# Patient Record
Sex: MALE | Race: WHITE | Employment: OTHER | URBAN - METROPOLITAN AREA
[De-identification: names, ages, dates, MRNs, and addresses within clinical notes are randomized per-mention and may not be internally consistent; named-entity substitution may affect disease eponyms.]

---

## 2017-02-17 ENCOUNTER — HOSPITAL ENCOUNTER (OUTPATIENT)
Dept: MRI IMAGING | Age: 62
Discharge: HOME | End: 2017-02-17
Attending: INTERNAL MEDICINE | Admitting: INTERNAL MEDICINE

## 2017-02-17 DIAGNOSIS — D35.2 BENIGN NEOPLASM OF PITUITARY GLAND (HCC): ICD-10-CM

## 2017-02-17 DIAGNOSIS — D35.2 BENIGN TUMOR OF PITUITARY GLAND (HCC): ICD-10-CM

## 2017-07-28 ENCOUNTER — HOSPITAL ENCOUNTER (OUTPATIENT)
Dept: MRI IMAGING | Age: 62
Discharge: HOME | End: 2017-07-28
Attending: FAMILY MEDICINE | Admitting: FAMILY MEDICINE

## 2017-07-28 DIAGNOSIS — M54.41 LOW BACK PAIN WITH RIGHT-SIDED SCIATICA, UNSPECIFIED BACK PAIN LATERALITY, UNSPECIFIED CHRONICITY: ICD-10-CM

## 2017-07-28 DIAGNOSIS — M25.551 RIGHT HIP PAIN: ICD-10-CM

## 2017-07-28 DIAGNOSIS — M25.551 PAIN IN RIGHT HIP: ICD-10-CM

## 2017-07-28 LAB
ALBUMIN SERPL-MCNC: 4.5 GM/DL (ref 3.4–5)
ALP BLD-CCNC: 44 IU/L (ref 40–129)
ALT SERPL-CCNC: 30 U/L (ref 10–40)
ANION GAP SERPL CALCULATED.3IONS-SCNC: 14 MMOL/L (ref 4–16)
AST SERPL-CCNC: 17 IU/L (ref 15–37)
BILIRUB SERPL-MCNC: 0.7 MG/DL (ref 0–1)
BUN BLDV-MCNC: 25 MG/DL (ref 6–23)
CALCIUM SERPL-MCNC: 9.4 MG/DL (ref 8.3–10.6)
CHLORIDE BLD-SCNC: 100 MMOL/L (ref 99–110)
CHOLESTEROL: 208 MG/DL
CO2: 26 MMOL/L (ref 21–32)
CREAT SERPL-MCNC: 1.1 MG/DL (ref 0.9–1.3)
ESTIMATED AVERAGE GLUCOSE: 117 MG/DL
GFR AFRICAN AMERICAN: >60 ML/MIN/1.73M2
GFR NON-AFRICAN AMERICAN: >60 ML/MIN/1.73M2
GLUCOSE BLD-MCNC: 93 MG/DL (ref 70–140)
HBA1C MFR BLD: 5.7 % (ref 4.2–6.3)
HDLC SERPL-MCNC: 51 MG/DL
LDL CHOLESTEROL DIRECT: 143 MG/DL
POTASSIUM SERPL-SCNC: 4.5 MMOL/L (ref 3.5–5.1)
SODIUM BLD-SCNC: 140 MMOL/L (ref 135–145)
TOTAL PROTEIN: 6.9 GM/DL (ref 6.4–8.2)
TRIGL SERPL-MCNC: 118 MG/DL

## 2019-05-20 RX ORDER — LISINOPRIL 20 MG/1
20 TABLET ORAL DAILY
COMMUNITY

## 2019-05-20 NOTE — PROGRESS NOTES
.   1. Do not drink anything after 0930- unless instructed by your doctor prior to surgery. This includes no water, chewing gum or mints. 2. Follow your directions as prescribed by the doctor for your procedure and medications. 3.Check with your Doctor regarding stopping Plavix, Coumadin, Lovenox,Effient,Pradaxa,Xarelto, Fragmin or other blood thinners and follow their instructions. 4. Do not smoke, and do not drink any alcoholic beverages 24 hours prior to surgery. This includes NA Beer. 5. You may brush your teeth and gargle the morning of surgery. DO NOT SWALLOW WATER   6. You MUST make arrangements for a responsible adult to take you home after your surgery and be able to check on you every couple hours for the day. You will not be allowed to leave alone or drive yourself home. It is strongly suggested someone stay with you the first 24 hrs. Your surgery will be cancelled if you do not have a ride home. 7. Please wear simple, loose fitting clothing to the hospital.  [place] not bring valuables (money, credit cards, checkbooks, etc.) Do not wear any makeup (including no eye makeup) or nail polish on your fingers or toes. 8. DO NOT wear any jewelry or piercings on day of surgery. All body piercing jewelry must be removed. 9. If you have dentures, they will be removed before going to the OR; we will provide you a container. If you wear contact lenses or glasses, they will be removed; please bring a case for them. 10. If you  have a Living Will and Durable Power of  for Healthcare, please bring in a copy. 11 Please bring picture ID,  insurance card, paperwork from the doctors office    (H & P, Consent, & card for implantable devices).

## 2019-05-22 ENCOUNTER — HOSPITAL ENCOUNTER (OUTPATIENT)
Age: 64
Setting detail: OUTPATIENT SURGERY
Discharge: HOME | End: 2019-05-22
Attending: SPECIALIST | Admitting: SPECIALIST
Payer: COMMERCIAL

## 2019-05-22 VITALS
WEIGHT: 238 LBS | SYSTOLIC BLOOD PRESSURE: 116 MMHG | HEIGHT: 69 IN | BODY MASS INDEX: 35.25 KG/M2 | RESPIRATION RATE: 16 BRPM | HEART RATE: 70 BPM | DIASTOLIC BLOOD PRESSURE: 65 MMHG | OXYGEN SATURATION: 98 % | TEMPERATURE: 97 F

## 2019-05-22 PROCEDURE — 99152 MOD SED SAME PHYS/QHP 5/>YRS: CPT | Performed by: SPECIALIST

## 2019-05-22 PROCEDURE — 7100000011 HC PHASE II RECOVERY - ADDTL 15 MIN: Performed by: SPECIALIST

## 2019-05-22 PROCEDURE — 3609010600 HC COLONOSCOPY POLYPECTOMY SNARE/COLD BIOPSY: Performed by: SPECIALIST

## 2019-05-22 PROCEDURE — 7100000010 HC PHASE II RECOVERY - FIRST 15 MIN: Performed by: SPECIALIST

## 2019-05-22 PROCEDURE — 99153 MOD SED SAME PHYS/QHP EA: CPT | Performed by: SPECIALIST

## 2019-05-22 PROCEDURE — 2709999900 HC NON-CHARGEABLE SUPPLY: Performed by: SPECIALIST

## 2019-05-22 PROCEDURE — 6360000002 HC RX W HCPCS: Performed by: SPECIALIST

## 2019-05-22 PROCEDURE — 88305 TISSUE EXAM BY PATHOLOGIST: CPT

## 2019-05-22 RX ORDER — SODIUM CHLORIDE, SODIUM LACTATE, POTASSIUM CHLORIDE, CALCIUM CHLORIDE 600; 310; 30; 20 MG/100ML; MG/100ML; MG/100ML; MG/100ML
INJECTION, SOLUTION INTRAVENOUS CONTINUOUS
Status: DISCONTINUED | OUTPATIENT
Start: 2019-05-22 | End: 2019-05-22 | Stop reason: HOSPADM

## 2019-05-22 RX ORDER — MEPERIDINE HYDROCHLORIDE 50 MG/ML
INJECTION INTRAMUSCULAR; INTRAVENOUS; SUBCUTANEOUS PRN
Status: DISCONTINUED | OUTPATIENT
Start: 2019-05-22 | End: 2019-05-22 | Stop reason: ALTCHOICE

## 2019-05-22 RX ORDER — MIDAZOLAM HYDROCHLORIDE 1 MG/ML
INJECTION INTRAMUSCULAR; INTRAVENOUS PRN
Status: DISCONTINUED | OUTPATIENT
Start: 2019-05-22 | End: 2019-05-22 | Stop reason: ALTCHOICE

## 2019-05-22 ASSESSMENT — PAIN SCALES - GENERAL
PAINLEVEL_OUTOF10: 0
PAINLEVEL_OUTOF10: 0

## 2019-05-22 ASSESSMENT — PAIN - FUNCTIONAL ASSESSMENT: PAIN_FUNCTIONAL_ASSESSMENT: 0-10

## 2019-05-22 NOTE — PROGRESS NOTES
1210-.To Pacu, awake, denies any pain, nausea or SOB, states \"lightheaded\" family @ bedside, updated on plan of care, call light in reach. Dr. Richard Roy in to update on procedure, family @ bedside  1235-Lightheadedness resolved, repositioned to semi-fowlers, PO fluids given  1242-Patient care to [facility], RN with verbal report.

## 2019-05-22 NOTE — PROGRESS NOTES
2204  Report received from SINCERE Ferrera RN regarding patient's progress. More awake and sitting up in bed taking po fluids well. No c/o pain.

## 2019-05-22 NOTE — BRIEF OP NOTE
BRIEF COLONOSCOPY REPORT:    Pre-op Diagnosis: routine    Post-op Diagnosis: see impression below    Anesthesia: MAC  Estimated blood loss: less than 50 cc  Implants: none  Drains: none  Complications: none  Specimens- none- or as referred to below     Impression:    1) two 3 mm polyps removed    2) scattered sigmoid divertics   3) small internal hemorrhoids        Suggest:  1) If either of the resected polyps is an adenoma or sessile serrated polyp then follow up colonoscopy in 5 years is suggested. 2) if either of the polyps has a \"villous\" component or high-grade dysplasia, then repeat colonoscopy in 3 years   3) If both polyps are simply hyperplastic then follow up colonoscopy is not needed for 10 years. The complete operative report (including photos) is available in the following locations:   1) soft chart now   2) report will also be scanned and can then be found by going to \"chart review\" then \"notes\" then \"op report\" or by going to \"chart review\" then \"media\" then \"op report\". For review of photos, may need to go to page 2.

## 2021-06-14 ENCOUNTER — OFFICE VISIT (OUTPATIENT)
Dept: CARDIOLOGY CLINIC | Age: 66
End: 2021-06-14
Payer: MEDICARE

## 2021-06-14 VITALS
HEIGHT: 69 IN | SYSTOLIC BLOOD PRESSURE: 124 MMHG | BODY MASS INDEX: 36.67 KG/M2 | DIASTOLIC BLOOD PRESSURE: 80 MMHG | HEART RATE: 92 BPM | WEIGHT: 247.6 LBS

## 2021-06-14 DIAGNOSIS — I20.8 OTHER FORMS OF ANGINA PECTORIS (HCC): Primary | ICD-10-CM

## 2021-06-14 PROCEDURE — 3017F COLORECTAL CA SCREEN DOC REV: CPT | Performed by: INTERNAL MEDICINE

## 2021-06-14 PROCEDURE — G8417 CALC BMI ABV UP PARAM F/U: HCPCS | Performed by: INTERNAL MEDICINE

## 2021-06-14 PROCEDURE — 99204 OFFICE O/P NEW MOD 45 MIN: CPT | Performed by: INTERNAL MEDICINE

## 2021-06-14 PROCEDURE — 1123F ACP DISCUSS/DSCN MKR DOCD: CPT | Performed by: INTERNAL MEDICINE

## 2021-06-14 PROCEDURE — 4040F PNEUMOC VAC/ADMIN/RCVD: CPT | Performed by: INTERNAL MEDICINE

## 2021-06-14 PROCEDURE — 1036F TOBACCO NON-USER: CPT | Performed by: INTERNAL MEDICINE

## 2021-06-14 PROCEDURE — G8427 DOCREV CUR MEDS BY ELIG CLIN: HCPCS | Performed by: INTERNAL MEDICINE

## 2021-06-14 PROCEDURE — 93000 ELECTROCARDIOGRAM COMPLETE: CPT | Performed by: INTERNAL MEDICINE

## 2021-06-14 RX ORDER — FLUTICASONE PROPIONATE 220 UG/1
1 AEROSOL, METERED RESPIRATORY (INHALATION) 2 TIMES DAILY
COMMUNITY
Start: 2021-04-28

## 2021-06-14 RX ORDER — PREDNISONE 1 MG/1
TABLET ORAL
COMMUNITY
Start: 2021-05-26

## 2021-06-14 RX ORDER — ALBUTEROL SULFATE 90 UG/1
AEROSOL, METERED RESPIRATORY (INHALATION)
COMMUNITY
Start: 2020-10-19

## 2021-06-14 RX ORDER — ATORVASTATIN CALCIUM 40 MG/1
TABLET, FILM COATED ORAL
COMMUNITY
Start: 2021-03-23 | End: 2021-06-14

## 2021-06-14 RX ORDER — CLOTRIMAZOLE AND BETAMETHASONE DIPROPIONATE 10; .64 MG/G; MG/G
CREAM TOPICAL
COMMUNITY
Start: 2021-05-26

## 2021-06-14 NOTE — PROGRESS NOTES
Whit Pineda  is a  New patient  ,76 y.o.   male here for evaluation of the following chief complaint(s):    LE swelling        SUBJECTIVE/OBJECTIVE:  HPI : h/o  Had ablation for CVI, HTN  now here  Had swelling in LE had ablation  , has no CP, has mild exertional SOB  For a few weeks. Review of Systems    LE swelling, SOB    Vitals:    06/14/21 1321   BP: 124/80   Pulse: 92   Weight: 247 lb 9.6 oz (112.3 kg)   Height: 5' 9\" (1.753 m)     /80   Pulse 92   Ht 5' 9\" (1.753 m)   Wt 247 lb 9.6 oz (112.3 kg)   BMI 36.56 kg/m²   No flowsheet data found. Wt Readings from Last 3 Encounters:   06/14/21 247 lb 9.6 oz (112.3 kg)   05/22/19 238 lb (108 kg)   05/07/15 263 lb (119.3 kg)     Body mass index is 36.56 kg/m². Physical Exam     Neck: JVD  no    Lungs : clear    Cardio : Si and S2 audilble      Ext: edema  +1    All pertinent data reviewed  y    Meds : reviewed   y      Tests ordered    y    ASSESSMENT/PLAN:    - sob  And LE swelling:  Echo and ETT    -  Hypertension: Patients blood pressure is normal. Patient is advised about low sodium diet. Present medical regimen will not be changed. on liosinopril            Mortality from the morbid obesity is very high: Body mass index is 36.56 kg/m². An electronic signature was used to authenticate this note.     --Elvira Astorga MD

## 2021-07-09 ENCOUNTER — PROCEDURE VISIT (OUTPATIENT)
Dept: CARDIOLOGY CLINIC | Age: 66
End: 2021-07-09
Payer: MEDICARE

## 2021-07-09 DIAGNOSIS — I20.8 OTHER FORMS OF ANGINA PECTORIS (HCC): ICD-10-CM

## 2021-07-09 DIAGNOSIS — R06.02 SOB (SHORTNESS OF BREATH) ON EXERTION: ICD-10-CM

## 2021-07-09 LAB
LV EF: 58 %
LVEF MODALITY: NORMAL

## 2021-07-09 PROCEDURE — 93015 CV STRESS TEST SUPVJ I&R: CPT | Performed by: INTERNAL MEDICINE

## 2021-07-09 PROCEDURE — 93306 TTE W/DOPPLER COMPLETE: CPT | Performed by: INTERNAL MEDICINE

## 2021-07-12 ENCOUNTER — OFFICE VISIT (OUTPATIENT)
Dept: CARDIOLOGY CLINIC | Age: 66
End: 2021-07-12
Payer: MEDICARE

## 2021-07-12 VITALS
HEART RATE: 82 BPM | SYSTOLIC BLOOD PRESSURE: 138 MMHG | HEIGHT: 69 IN | DIASTOLIC BLOOD PRESSURE: 90 MMHG | OXYGEN SATURATION: 96 % | BODY MASS INDEX: 36.5 KG/M2 | WEIGHT: 246.4 LBS

## 2021-07-12 DIAGNOSIS — I10 ESSENTIAL HYPERTENSION: ICD-10-CM

## 2021-07-12 PROCEDURE — 3017F COLORECTAL CA SCREEN DOC REV: CPT | Performed by: NURSE PRACTITIONER

## 2021-07-12 PROCEDURE — 99213 OFFICE O/P EST LOW 20 MIN: CPT | Performed by: NURSE PRACTITIONER

## 2021-07-12 PROCEDURE — G8427 DOCREV CUR MEDS BY ELIG CLIN: HCPCS | Performed by: NURSE PRACTITIONER

## 2021-07-12 PROCEDURE — 1123F ACP DISCUSS/DSCN MKR DOCD: CPT | Performed by: NURSE PRACTITIONER

## 2021-07-12 PROCEDURE — 4040F PNEUMOC VAC/ADMIN/RCVD: CPT | Performed by: NURSE PRACTITIONER

## 2021-07-12 PROCEDURE — 1036F TOBACCO NON-USER: CPT | Performed by: NURSE PRACTITIONER

## 2021-07-12 PROCEDURE — G8417 CALC BMI ABV UP PARAM F/U: HCPCS | Performed by: NURSE PRACTITIONER

## 2021-07-12 ASSESSMENT — ENCOUNTER SYMPTOMS: SHORTNESS OF BREATH: 0

## 2021-07-12 NOTE — PROGRESS NOTES
7/12/2021  Primary cardiologist: Dr. Evelyn Flor  is an established 76 y.o.  male here for follow-up on recent testing echo and ETT  H/o chest pain       SUBJECTIVE/OBJECTIVE:  HPI : Justin is a 27-year-old gentleman who was initially seen as a consult in 2015 for shortness of breath and chest pain. He has a past history of hypertension and CVI s/p ablation. He was then seen again in 2021 with complaints of lower extremity edema. Justin reports he has been feeling well. States that his shortness of breath has improved with the initiation of Flovent. States he is swelling to lower extremities also has improved. Denies any chest pain or dizziness. Denies any palpitations. Review of Systems   Cardiovascular: Positive for dyspnea on exertion. Negative for chest pain, leg swelling and palpitations. Respiratory: Negative for shortness of breath. Neurological: Negative for dizziness and light-headedness. All other systems reviewed and are negative. Vitals:    07/12/21 0855   BP: (!) 138/90   Site: Right Upper Arm   Position: Sitting   Cuff Size: Large Adult   Pulse: 82   SpO2: 96%   Weight: 246 lb 6.4 oz (111.8 kg)   Height: 5' 9\" (1.753 m)     No flowsheet data found. Wt Readings from Last 3 Encounters:   07/12/21 246 lb 6.4 oz (111.8 kg)   06/14/21 247 lb 9.6 oz (112.3 kg)   05/22/19 238 lb (108 kg)     Body mass index is 36.39 kg/m². Physical Exam  Constitutional:       Appearance: He is obese. HENT:      Head: Normocephalic and atraumatic. Mouth/Throat:      Mouth: Mucous membranes are dry. Cardiovascular:      Rate and Rhythm: Normal rate and regular rhythm. Pulses: Normal pulses. Heart sounds: Normal heart sounds. Pulmonary:      Effort: Pulmonary effort is normal.      Breath sounds: Normal breath sounds. Abdominal:      Palpations: Abdomen is soft. Musculoskeletal:      Right lower leg: No edema. Left lower leg: No edema.    Skin:     General: Skin is warm and dry. Capillary Refill: Capillary refill takes less than 2 seconds. Neurological:      General: No focal deficit present. Mental Status: He is alert and oriented to person, place, and time. Psychiatric:         Mood and Affect: Mood normal.         Behavior: Behavior normal.            All pertinent data reviewed and discussed with patient    Current Outpatient Medications   Medication Sig Dispense Refill    Ascorbic Acid (VITAMIN C PO) Take by mouth      fluticasone (FLOVENT HFA) 220 MCG/ACT inhaler Inhale 1 puff into the lungs 2 times daily      albuterol sulfate  (90 Base) MCG/ACT inhaler inhale 2 puff by inhalation route every 4 - 6 hours as needed      clotrimazole-betamethasone (LOTRISONE) 1-0.05 % cream apply to affected area twice daily as needed for rash      lisinopril (PRINIVIL;ZESTRIL) 20 MG tablet Take 20 mg by mouth daily      fluticasone (FLONASE) 50 MCG/ACT nasal spray 1 spray by Nasal route nightly   3    Multiple Vitamins-Minerals (MULTIVITAMIN PO) Take 1 tablet by mouth daily      predniSONE (DELTASONE) 5 MG tablet  (Patient not taking: Reported on 7/12/2021)       No current facility-administered medications for this visit. Transthoracic echocardiogram : 07/2021  Limited study due to patients body habitus. Left ventricular function and size is normal, EF is estimated at 55-60%. Mild left ventricular hypertrophy with E/A reversal, diastolic function. No regional wall motion abnormalities were detected. Mild mitral and tricuspid regurgitation is present. RVSP is 22 mmHg. No evidence of pericardial effusion. Exercise stress test  07/2021  Normal near maximal study negative for angina or ECG evidence of ischemia. Exercise tolerance is good. Exercised for 7:30 minutes on Matias protocol,   reached workload of 9.30 METS. Appropriate hemodynamic response to exercise is noted.        ASSESSMENT/PLAN:    Shortness of breath   Improved

## 2021-07-12 NOTE — LETTER
Dilcia Rizo Sor  1955  [id number]    Have you had any Chest Pain that is not new? - No    Have you had any Shortness of Breath - Yes (Patient uses inhaler to help SOB. Hasn't experienced episodes after using inhaler.)   If Yes - When on exertion    Have you had any dizziness - No    Have you had any palpitations that are not new? - No    Do you have any edema - swelling in No    Dr. Adelina Barnett performed venous ablation on patient in January 2021, and swelling in left leg has significantly reduced since then. Vein \"LEG PROBLEM Questionnaire\"  1. Do you have prominent leg veins? Yes   2. Do you have any skin discoloration? No  3. Do you have any healed or active sores? No  4. Do you have swelling of the legs? No (used to)   5. Do you have a family history of varicose veins? Yes  6. Does your profession involve pro-longed        standing or heavy lifting? No  7. Have you been fighting overweight problems? Yes  8. Do you have restless legs? No (rarely)   9. Do you have any night time cramps? No  10. Do you have any of the following in your legs:        NONE     When did you have your last labs drawn   Where did you have them done   What doctor ordered     If we do not have these labs you are retrieve these labs for these providers!     Do you have a surgery or procedure scheduled in the near future - No

## 2021-07-12 NOTE — PATIENT INSTRUCTIONS
**It is YOUR responsibilty to bring medication bottles and/or updated medication list to [address] [place] [address]. This will allow us to better serve you and all your healthcare needs**    Please be informed that if you contact our office outside of normal business hours the physician on call cannot help with any scheduling or rescheduling issues, procedure instruction questions or any type of medication issue. We advise you for any urgent/emergency that you go to the nearest emergency room!     PLEASE CALL OUR OFFICE DURING NORMAL BUSINESS HOURS    Monday - Friday   8 am to 5 pm    [place] [address]: [phone]    [place]:  [phone]

## 2024-07-02 ENCOUNTER — HOSPITAL ENCOUNTER (INPATIENT)
Age: 69
LOS: 3 days | Discharge: HOME HEALTH | End: 2024-07-05
Attending: STUDENT IN AN ORGANIZED HEALTH CARE EDUCATION/TRAINING PROGRAM
Payer: MEDICARE

## 2024-07-02 ENCOUNTER — APPOINTMENT (OUTPATIENT)
Dept: CT IMAGING | Age: 69
End: 2024-07-02
Payer: MEDICARE

## 2024-07-02 DIAGNOSIS — I63.9 CEREBROVASCULAR ACCIDENT (CVA), UNSPECIFIED MECHANISM (HCC): ICD-10-CM

## 2024-07-02 DIAGNOSIS — R29.898 LEFT ARM WEAKNESS: Primary | ICD-10-CM

## 2024-07-02 DIAGNOSIS — R47.81 SLURRED SPEECH: ICD-10-CM

## 2024-07-02 DIAGNOSIS — I63.9 ACUTE CVA (CEREBROVASCULAR ACCIDENT) (HCC): ICD-10-CM

## 2024-07-02 DIAGNOSIS — R27.0 ATAXIA OF LEFT UPPER EXTREMITY: ICD-10-CM

## 2024-07-02 LAB
ALBUMIN SERPL-MCNC: 4.2 GM/DL (ref 3.4–5)
ALP BLD-CCNC: 54 IU/L (ref 40–129)
ALT SERPL-CCNC: 21 U/L (ref 10–40)
ANION GAP SERPL CALCULATED.3IONS-SCNC: 12 MMOL/L (ref 7–16)
AST SERPL-CCNC: 18 IU/L (ref 15–37)
BASOPHILS ABSOLUTE: 0 K/CU MM
BASOPHILS RELATIVE PERCENT: 0.5 % (ref 0–1)
BILIRUB SERPL-MCNC: 0.5 MG/DL (ref 0–1)
BUN SERPL-MCNC: 24 MG/DL (ref 6–23)
CALCIUM SERPL-MCNC: 9.4 MG/DL (ref 8.3–10.6)
CHLORIDE BLD-SCNC: 100 MMOL/L (ref 99–110)
CO2: 24 MMOL/L (ref 21–32)
CREAT SERPL-MCNC: 1 MG/DL (ref 0.9–1.3)
DIFFERENTIAL TYPE: ABNORMAL
EOSINOPHILS ABSOLUTE: 0.3 K/CU MM
EOSINOPHILS RELATIVE PERCENT: 3.4 % (ref 0–3)
GFR, ESTIMATED: 81 ML/MIN/1.73M2
GLUCOSE SERPL-MCNC: 122 MG/DL (ref 70–99)
HCT VFR BLD CALC: 37 % (ref 42–52)
HEMOGLOBIN: 12.6 GM/DL (ref 13.5–18)
IMMATURE NEUTROPHIL %: 0.2 % (ref 0–0.43)
INR BLD: 1.1 INDEX
LYMPHOCYTES ABSOLUTE: 1.3 K/CU MM
LYMPHOCYTES RELATIVE PERCENT: 15.5 % (ref 24–44)
MCH RBC QN AUTO: 29.6 PG (ref 27–31)
MCHC RBC AUTO-ENTMCNC: 34.1 % (ref 32–36)
MCV RBC AUTO: 87.1 FL (ref 78–100)
MONOCYTES ABSOLUTE: 0.6 K/CU MM
MONOCYTES RELATIVE PERCENT: 7.1 % (ref 0–4)
NEUTROPHILS ABSOLUTE: 6 K/CU MM
NEUTROPHILS RELATIVE PERCENT: 73.3 % (ref 36–66)
NUCLEATED RBC %: 0 %
PDW BLD-RTO: 13 % (ref 11.7–14.9)
PLATELET # BLD: 192 K/CU MM (ref 140–440)
PMV BLD AUTO: 9.9 FL (ref 7.5–11.1)
POTASSIUM SERPL-SCNC: 4.1 MMOL/L (ref 3.5–5.1)
PROTHROMBIN TIME: 14.5 SECONDS (ref 11.7–14.5)
RBC # BLD: 4.25 M/CU MM (ref 4.6–6.2)
SODIUM BLD-SCNC: 136 MMOL/L (ref 135–145)
TOTAL IMMATURE NEUTOROPHIL: 0.02 K/CU MM
TOTAL NUCLEATED RBC: 0 K/CU MM
TOTAL PROTEIN: 6.4 GM/DL (ref 6.4–8.2)
WBC # BLD: 8.2 K/CU MM (ref 4–10.5)

## 2024-07-02 PROCEDURE — 70496 CT ANGIOGRAPHY HEAD: CPT

## 2024-07-02 PROCEDURE — 70450 CT HEAD/BRAIN W/O DYE: CPT

## 2024-07-02 PROCEDURE — 2580000003 HC RX 258: Performed by: NURSE PRACTITIONER

## 2024-07-02 PROCEDURE — 96374 THER/PROPH/DIAG INJ IV PUSH: CPT

## 2024-07-02 PROCEDURE — 6360000002 HC RX W HCPCS: Performed by: STUDENT IN AN ORGANIZED HEALTH CARE EDUCATION/TRAINING PROGRAM

## 2024-07-02 PROCEDURE — 80053 COMPREHEN METABOLIC PANEL: CPT

## 2024-07-02 PROCEDURE — 99285 EMERGENCY DEPT VISIT HI MDM: CPT

## 2024-07-02 PROCEDURE — 3E03317 INTRODUCTION OF OTHER THROMBOLYTIC INTO PERIPHERAL VEIN, PERCUTANEOUS APPROACH: ICD-10-PCS | Performed by: STUDENT IN AN ORGANIZED HEALTH CARE EDUCATION/TRAINING PROGRAM

## 2024-07-02 PROCEDURE — 2000000000 HC ICU R&B

## 2024-07-02 PROCEDURE — 6370000000 HC RX 637 (ALT 250 FOR IP): Performed by: STUDENT IN AN ORGANIZED HEALTH CARE EDUCATION/TRAINING PROGRAM

## 2024-07-02 PROCEDURE — 6360000004 HC RX CONTRAST MEDICATION: Performed by: STUDENT IN AN ORGANIZED HEALTH CARE EDUCATION/TRAINING PROGRAM

## 2024-07-02 PROCEDURE — 93005 ELECTROCARDIOGRAM TRACING: CPT | Performed by: STUDENT IN AN ORGANIZED HEALTH CARE EDUCATION/TRAINING PROGRAM

## 2024-07-02 PROCEDURE — 85025 COMPLETE CBC W/AUTO DIFF WBC: CPT

## 2024-07-02 PROCEDURE — 85610 PROTHROMBIN TIME: CPT

## 2024-07-02 PROCEDURE — 2580000003 HC RX 258: Performed by: STUDENT IN AN ORGANIZED HEALTH CARE EDUCATION/TRAINING PROGRAM

## 2024-07-02 RX ORDER — LABETALOL HYDROCHLORIDE 5 MG/ML
10 INJECTION, SOLUTION INTRAVENOUS
Status: DISCONTINUED | OUTPATIENT
Start: 2024-07-02 | End: 2024-07-05 | Stop reason: HOSPADM

## 2024-07-02 RX ORDER — SODIUM CHLORIDE 9 MG/ML
INJECTION, SOLUTION INTRAVENOUS PRN
Status: DISCONTINUED | OUTPATIENT
Start: 2024-07-02 | End: 2024-07-02

## 2024-07-02 RX ORDER — ASPIRIN 300 MG/1
300 SUPPOSITORY RECTAL NIGHTLY
Status: DISCONTINUED | OUTPATIENT
Start: 2024-07-03 | End: 2024-07-05 | Stop reason: HOSPADM

## 2024-07-02 RX ORDER — POLYETHYLENE GLYCOL 3350 17 G/17G
17 POWDER, FOR SOLUTION ORAL DAILY PRN
Status: DISCONTINUED | OUTPATIENT
Start: 2024-07-02 | End: 2024-07-05 | Stop reason: HOSPADM

## 2024-07-02 RX ORDER — ONDANSETRON 4 MG/1
4 TABLET, ORALLY DISINTEGRATING ORAL EVERY 8 HOURS PRN
Status: DISCONTINUED | OUTPATIENT
Start: 2024-07-02 | End: 2024-07-05 | Stop reason: HOSPADM

## 2024-07-02 RX ORDER — ENOXAPARIN SODIUM 100 MG/ML
40 INJECTION SUBCUTANEOUS NIGHTLY
Status: DISCONTINUED | OUTPATIENT
Start: 2024-07-03 | End: 2024-07-05 | Stop reason: HOSPADM

## 2024-07-02 RX ORDER — HYDRALAZINE HYDROCHLORIDE 20 MG/ML
10 INJECTION INTRAMUSCULAR; INTRAVENOUS
Status: DISCONTINUED | OUTPATIENT
Start: 2024-07-02 | End: 2024-07-05 | Stop reason: HOSPADM

## 2024-07-02 RX ORDER — ASPIRIN 81 MG/1
81 TABLET, CHEWABLE ORAL NIGHTLY
Status: DISCONTINUED | OUTPATIENT
Start: 2024-07-03 | End: 2024-07-05 | Stop reason: HOSPADM

## 2024-07-02 RX ORDER — SODIUM CHLORIDE 0.9 % (FLUSH) 0.9 %
5-40 SYRINGE (ML) INJECTION EVERY 12 HOURS SCHEDULED
Status: DISCONTINUED | OUTPATIENT
Start: 2024-07-02 | End: 2024-07-02

## 2024-07-02 RX ORDER — SODIUM CHLORIDE 9 MG/ML
INJECTION, SOLUTION INTRAVENOUS PRN
Status: DISCONTINUED | OUTPATIENT
Start: 2024-07-02 | End: 2024-07-05 | Stop reason: HOSPADM

## 2024-07-02 RX ORDER — M-VIT,TX,IRON,MINS/CALC/FOLIC 27MG-0.4MG
1 TABLET ORAL DAILY
Status: DISCONTINUED | OUTPATIENT
Start: 2024-07-02 | End: 2024-07-05 | Stop reason: HOSPADM

## 2024-07-02 RX ORDER — SODIUM CHLORIDE 0.9 % (FLUSH) 0.9 %
10 SYRINGE (ML) INJECTION ONCE
Status: COMPLETED | OUTPATIENT
Start: 2024-07-02 | End: 2024-07-02

## 2024-07-02 RX ORDER — ATORVASTATIN CALCIUM 40 MG/1
80 TABLET, FILM COATED ORAL NIGHTLY
Status: DISCONTINUED | OUTPATIENT
Start: 2024-07-02 | End: 2024-07-03

## 2024-07-02 RX ORDER — ONDANSETRON 2 MG/ML
4 INJECTION INTRAMUSCULAR; INTRAVENOUS EVERY 6 HOURS PRN
Status: DISCONTINUED | OUTPATIENT
Start: 2024-07-02 | End: 2024-07-05 | Stop reason: HOSPADM

## 2024-07-02 RX ORDER — SODIUM CHLORIDE 0.9 % (FLUSH) 0.9 %
5-40 SYRINGE (ML) INJECTION EVERY 12 HOURS SCHEDULED
Status: DISCONTINUED | OUTPATIENT
Start: 2024-07-02 | End: 2024-07-05 | Stop reason: HOSPADM

## 2024-07-02 RX ORDER — SODIUM CHLORIDE 0.9 % (FLUSH) 0.9 %
5-40 SYRINGE (ML) INJECTION PRN
Status: DISCONTINUED | OUTPATIENT
Start: 2024-07-02 | End: 2024-07-05 | Stop reason: HOSPADM

## 2024-07-02 RX ORDER — SODIUM CHLORIDE 0.9 % (FLUSH) 0.9 %
5-40 SYRINGE (ML) INJECTION PRN
Status: DISCONTINUED | OUTPATIENT
Start: 2024-07-02 | End: 2024-07-02

## 2024-07-02 RX ADMIN — Medication 25 MG: at 17:33

## 2024-07-02 RX ADMIN — SODIUM CHLORIDE, PRESERVATIVE FREE 10 ML: 5 INJECTION INTRAVENOUS at 20:25

## 2024-07-02 RX ADMIN — SODIUM CHLORIDE, PRESERVATIVE FREE 10 ML: 5 INJECTION INTRAVENOUS at 17:33

## 2024-07-02 RX ADMIN — IOPAMIDOL 75 ML: 755 INJECTION, SOLUTION INTRAVENOUS at 17:16

## 2024-07-02 RX ADMIN — Medication 1 TABLET: at 20:20

## 2024-07-02 ASSESSMENT — PAIN - FUNCTIONAL ASSESSMENT: PAIN_FUNCTIONAL_ASSESSMENT: NONE - DENIES PAIN

## 2024-07-02 ASSESSMENT — LIFESTYLE VARIABLES
HOW OFTEN DO YOU HAVE A DRINK CONTAINING ALCOHOL: NEVER
HOW MANY STANDARD DRINKS CONTAINING ALCOHOL DO YOU HAVE ON A TYPICAL DAY: PATIENT DOES NOT DRINK

## 2024-07-02 NOTE — PROGRESS NOTES
4 Eyes Skin Assessment     NAME:  Shane Padilla  YOB: 1955  MEDICAL RECORD NUMBER:  [id number]    The patient is being assess for  Admission    I agree that 2 RN's have performed a thorough Head to Toe Skin Assessment on the patient. ALL assessment sites listed below have been assessed.      Areas assessed by both nurses:    Head, Face, Ears, Shoulders, Back, Chest, Arms, Elbows, Hands, Sacrum. Buttock, Coccyx, Ischium, and Legs. Feet and Heels        Does the Patient have a Wound? No noted wound(s)       Law Prevention initiated:  No   Wound Care Orders initiated:  No    Pressure Injury (Stage 3,4, Unstageable, DTI, NWPT, and Complex wounds) if present place referral/consult order under :: No    New and Established Ostomies if present place consult order under : No      Nurse 1 eSignature: Electronically signed by Maggy Pickard RN on 7/2/24 at 7:13 PM EDT    **SHARE this note so that the co-signing nurse is able to place an eSignature**    Nurse 2 eSignature: Electronically signed by Lauryn Poole RN on 7/2/24 at 7:23 PM EDT

## 2024-07-02 NOTE — PROGRESS NOTES
Pt arrived to room 2128 from ED. Pt belongings include glasses, cell phone, shorts, belt, and wallet with money. Pt alert and oriented x4. Pt current NIHSS at handoff is 2. Pt oriented to room. Call light in reach. Bed alarm on. Nothing needed at this time. Report given to Lauryn LEBALNC.

## 2024-07-02 NOTE — ED PROVIDER NOTES
Emergency Department Encounter    Patient: Shane Padilla  MRN: [id number]  : 1955  Date of Evaluation: 2024  ED Provider:  Guido Younger DO    Triage Chief Complaint:   Extremity Weakness (L upper and L lower extremity weakness)    [place]:  Shane Padilla is a 68 y.o. male that presents with left arm weakness, slurred speech.  Reports he was doing yard work in the afternoon when he first noticed the symptoms.  No previous history of stroke.  No recent falls injuries.  Denies any chest pain or shortness of breath.  No anticoagulant use.  Last known normal was at 1 PM on afternoon of presentation.    ROS - see HPI, below listed is current ROS at time of my eval:  systems reviewed and negative except as above.     Past Medical History:   Diagnosis Date    Chest pain     H/O echocardiogram 05/18/15    EF 55-60% Normal LV and systolic function. Normal Echo.     History of cardiac monitoring 5/7/15    Event - NSR    History of Holter monitoring 10/24/15    24 hour - no significant ectopy, predominant rhythm sinus.    HTN (hypertension)     Hx of cardiovascular stress test 2015    treadmill     Past Surgical History:   Procedure Laterality Date    COLONOSCOPY      COLONOSCOPY  2019    Polyps x2, Mild diverticulosis, Internal grade 1 hemorrhoids    COLONOSCOPY N/A 2019    COLONOSCOPY POLYPECTOMY SNARE/COLD BIOPSY performed by Domo Harvey MD at [facility] OR    TONSILLECTOMY      as a child     Family History   Problem Relation Age of Onset    Heart Attack Father 70    High Blood Pressure Father     High Blood Pressure Mother     Elevated Lipids Mother     High Blood Pressure Brother      Social History     Socioeconomic History    Marital status:      Spouse name: Not on file    Number of children: Not on file    Years of education: Not on file    Highest education level: Not on file   Occupational History    Not on file   Tobacco Use    Smoking status: Never    Smokeless tobacco:  techniques which include one or more of the following: -Automated exposure control -Adjustment of the mA and/or kV according to patient size -Use of iterative reconstruction technique CT Radiation Dose DLP mGy-cm INDICATION: 68 years old Male stroke alert COMPARISON: None. FINDINGS: There are no abnormal extra-axial fluid collections or masses. There is generalized volume loss. The ventricles and sulci are otherwise normal in size and configuration. There is no midline shift. The gray-white matter differentiation is preserved. The orbits appear normal. The paranasal sinuses are clear. The mastoid air cells are clear. There are bilateral periventricular white matter hypodensities.     There is no acute abnormality. There are chronic small vessel disease. Electronically signed by Kavon Parikh          MDM:  CC/HPI Summary, DDx, ED Course, and Reassessment: On arrival given patient's presentation stroke alert was activated.  Noncontrast head CT without intracranial hemorrhage.  Initially mildly hypertensive but this resolved on its own.  CTA head and neck without any LVO.  Teladoc for stroke evaluated.  Recommended TNK.  I agree with this plan as patient feels that his deficits today would be disabling for him.  He has no contraindications to TNK.  Telestroke doc and myself discussed risk and benefits of TNK and patient was agreeable to it.  He does have mild anemia but otherwise no significant metabolic or hematologic abnormalities are concerning today.  He will be admitted to the ICU for frequent neurochecks given administration of TNK.         History from : Patient    Limitations to history : None    Patient was given the following medications:  Medications   sodium chloride flush 0.9 % injection 5-40 mL (has no administration in time range)   sodium chloride flush 0.9 % injection 5-40 mL (has no administration in time range)   0.9 % sodium chloride infusion (has no administration in time range)   dextrose bolus 10%

## 2024-07-02 NOTE — ED NOTES
Pt to ED via [facility] for c.o L upper and L lower extremity weakness since 1pm. Pt states that he was outside doing yardwork when he started having R eye pain then noticed his balance was off. Pt states had trouble walking into the house. Pt states that he felt as though his L arm was \"sleeping\", and was having slurred speech but when patient arrived, he just seemed to have delayed responses. Pt is alert and oriented x 4, lives at home with wife,  is independent of ADL's.

## 2024-07-02 NOTE — H&P
None. VENTRICLES: Normal in size and configuration for age. BRAIN PARENCHYMA: Gray-white matter differentiation is normal. No intracranial mass effect. SKULL: No destructive osseous process or fracture. PARANASAL SINUSES / MASTOIDS: No acute sinusitis or mastoiditis. ORBITS: Normal. EXTRACRANIAL SOFT TISSUES: Normal. OTHER: None. IMPRESSION: 1. Normal exam Electronically signed by Kavon Parikh    CT Head W/O Contrast    Result Date: 7/2/2024  EXAM: CT CODE NEURO HEAD WO CONTRAST Sagittal and coronal reformatted images were reviewed. CT imaging performed at this location utilizes radiation dose optimization techniques which include one or more of the following: -Automated exposure control -Adjustment of the mA and/or kV according to patient size -Use of iterative reconstruction technique CT Radiation Dose DLP mGy-cm INDICATION: 68 years old Male stroke alert COMPARISON: None. FINDINGS: There are no abnormal extra-axial fluid collections or masses. There is generalized volume loss. The ventricles and sulci are otherwise normal in size and configuration. There is no midline shift. The gray-white matter differentiation is preserved. The orbits appear normal. The paranasal sinuses are clear. The mastoid air cells are clear. There are bilateral periventricular white matter hypodensities.     There is no acute abnormality. There are chronic small vessel disease. Electronically signed by Kavon Parikh      CBC:   Recent Labs     07/02/24  1726   WBC 8.2   HGB 12.6*        BMP:    Recent Labs     07/02/24  1726      K 4.1      CO2 24   BUN 24*   CREATININE 1.0   GLUCOSE 122*     Hepatic:   Recent Labs     07/02/24  1726   AST 18   ALT 21   BILITOT 0.5   ALKPHOS 54     Lipids:   Lab Results   Component Value Date/Time    CHOL 208 07/28/2017 01:09 PM    HDL 51 07/28/2017 01:09 PM    TRIG 118 07/28/2017 01:09 PM     Hemoglobin A1C:   Lab Results   Component Value Date/Time    LABA1C 5.7 07/28/2017 01:09 PM      TSH: No results found for: \"TSH\"  Troponin:   Lab Results   Component Value Date/Time    TROPONINT <0.010 10/10/2014 10:38 PM     Lactic Acid: No results for input(s): \"LACTA\" in the last 72 hours.  BNP: No results for input(s): \"PROBNP\" in the last 72 hours.  UA:No results found for: \"NITRU\", \"COLORU\", \"PHUR\", \"LABCAST\", \"WBCUA\", \"RBCUA\", \"MUCUS\", \"TRICHOMONAS\", \"YEAST\", \"BACTERIA\", \"CLARITYU\", \"SPECGRAV\", \"LEUKOCYTESUR\", \"UROBILINOGEN\", \"BILIRUBINUR\", \"BLOODU\", \"GLUCOSEU\", \"KETUA\", \"AMORPHOUS\"  Urine Cultures: No results found for: \"LABURIN\"  Blood Cultures: No results found for: \"BC\"  No results found for: \"BLOODCULT2\"  Organism: No results found for: \"ORG\"    Critical care attestation:    I spent 55 cumulative minutes (excluding procedures), in full attention to this critically ill patient.    I have personally reviewed the patient's history and interval events in the EMR, along with vitals, labs and radiology imaging. Critical care time was spent personally providing care for this patient, excluding billable procedures, and no overlapping with any other provider.  This includes documenting my assessment and plan of care and developing the care plan to treat the problems below.    The high probability of deterioration required my full and direct attention, intervention, and personal management due to do to underlying diagnosis and clinical problems including the treatment of active or impending:  [x] Neurological monitoring and treatment  [] Respiratory failure -assessment of ventilator support, adjustment, ventilator weaning  [x] Hemodynamic and volume assessment with volume resuscitation  [] Mechanical and/or chemical support of the circulation,  [] Frequent vasoactive agent adjustment,  [] Renal replacement therapy,  [x] For rapid decompensation,  [] Electrolyte instability  [] Suctioning every 2 hours  [x] Every hour neuro checks  [] Every hour neurovascular checks  [x] Frequent evaluation of patient's

## 2024-07-03 ENCOUNTER — APPOINTMENT (OUTPATIENT)
Dept: NON INVASIVE DIAGNOSTICS | Age: 69
End: 2024-07-03
Attending: STUDENT IN AN ORGANIZED HEALTH CARE EDUCATION/TRAINING PROGRAM
Payer: MEDICARE

## 2024-07-03 ENCOUNTER — APPOINTMENT (OUTPATIENT)
Dept: CT IMAGING | Age: 69
End: 2024-07-03
Payer: MEDICARE

## 2024-07-03 LAB
ANION GAP SERPL CALCULATED.3IONS-SCNC: 12 MMOL/L (ref 7–16)
BASOPHILS ABSOLUTE: 0 K/CU MM
BASOPHILS RELATIVE PERCENT: 0.4 % (ref 0–1)
BUN SERPL-MCNC: 19 MG/DL (ref 6–23)
CALCIUM SERPL-MCNC: 9.6 MG/DL (ref 8.3–10.6)
CHLORIDE BLD-SCNC: 103 MMOL/L (ref 99–110)
CHOLEST SERPL-MCNC: 217 MG/DL
CO2: 25 MMOL/L (ref 21–32)
CREAT SERPL-MCNC: 0.8 MG/DL (ref 0.9–1.3)
DIFFERENTIAL TYPE: ABNORMAL
ECHO AV AREA PEAK VELOCITY: 2.8 CM2
ECHO AV AREA VTI: 3 CM2
ECHO AV AREA/BSA PEAK VELOCITY: 1.3 CM2/M2
ECHO AV AREA/BSA VTI: 1.4 CM2/M2
ECHO AV MEAN GRADIENT: 2 MMHG
ECHO AV MEAN VELOCITY: 0.7 M/S
ECHO AV PEAK GRADIENT: 4 MMHG
ECHO AV PEAK VELOCITY: 1 M/S
ECHO AV VELOCITY RATIO: 0.7
ECHO AV VTI: 19.9 CM
ECHO BSA: 2.22 M2
ECHO IVC PROX: 1.3 CM
ECHO LA AREA 4C: 17.7 CM2
ECHO LA DIAMETER INDEX: 1.34 CM/M2
ECHO LA DIAMETER: 2.9 CM
ECHO LA MAJOR AXIS: 5.9 CM
ECHO LA VOL MOD A4C: 43 ML (ref 18–58)
ECHO LA VOLUME INDEX MOD A4C: 20 ML/M2 (ref 16–34)
ECHO LV E' LATERAL VELOCITY: 9 CM/S
ECHO LV E' SEPTAL VELOCITY: 8 CM/S
ECHO LV EDV A4C: 125 ML
ECHO LV EDV INDEX A4C: 58 ML/M2
ECHO LV EJECTION FRACTION A4C: 57 %
ECHO LV ESV A4C: 54 ML
ECHO LV ESV INDEX A4C: 25 ML/M2
ECHO LV FRACTIONAL SHORTENING: 42 % (ref 28–44)
ECHO LV INTERNAL DIMENSION DIASTOLE INDEX: 2.45 CM/M2
ECHO LV INTERNAL DIMENSION DIASTOLIC: 5.3 CM (ref 4.2–5.9)
ECHO LV INTERNAL DIMENSION SYSTOLIC INDEX: 1.44 CM/M2
ECHO LV INTERNAL DIMENSION SYSTOLIC: 3.1 CM
ECHO LV IVSD: 0.9 CM (ref 0.6–1)
ECHO LV MASS 2D: 174.5 G (ref 88–224)
ECHO LV MASS INDEX 2D: 80.8 G/M2 (ref 49–115)
ECHO LV POSTERIOR WALL DIASTOLIC: 0.9 CM (ref 0.6–1)
ECHO LV RELATIVE WALL THICKNESS RATIO: 0.34
ECHO LVOT AREA: 3.8 CM2
ECHO LVOT AV VTI INDEX: 0.78
ECHO LVOT DIAM: 2.2 CM
ECHO LVOT MEAN GRADIENT: 1 MMHG
ECHO LVOT PEAK GRADIENT: 2 MMHG
ECHO LVOT PEAK VELOCITY: 0.7 M/S
ECHO LVOT STROKE VOLUME INDEX: 27.4 ML/M2
ECHO LVOT SV: 59.3 ML
ECHO LVOT VTI: 15.6 CM
ECHO MV A VELOCITY: 0.89 M/S
ECHO MV E DECELERATION TIME (DT): 319 MS
ECHO MV E VELOCITY: 0.71 M/S
ECHO MV E/A RATIO: 0.8
ECHO MV E/E' LATERAL: 7.89
ECHO MV E/E' RATIO (AVERAGED): 8.38
ECHO MV E/E' SEPTAL: 8.88
ECHO RV MID DIMENSION: 3.1 CM
EKG ATRIAL RATE: 79 BPM
EKG DIAGNOSIS: NORMAL
EKG P AXIS: 32 DEGREES
EKG P-R INTERVAL: 216 MS
EKG Q-T INTERVAL: 414 MS
EKG QRS DURATION: 98 MS
EKG QTC CALCULATION (BAZETT): 474 MS
EKG R AXIS: -40 DEGREES
EKG T AXIS: 30 DEGREES
EKG VENTRICULAR RATE: 79 BPM
EOSINOPHILS ABSOLUTE: 0.5 K/CU MM
EOSINOPHILS RELATIVE PERCENT: 6.4 % (ref 0–3)
ESTIMATED AVERAGE GLUCOSE: 108 MG/DL
GFR, ESTIMATED: >90 ML/MIN/1.73M2
GLUCOSE SERPL-MCNC: 96 MG/DL (ref 70–99)
HBA1C MFR BLD: 5.4 % (ref 4.2–6.3)
HCT VFR BLD CALC: 38.7 % (ref 42–52)
HDLC SERPL-MCNC: 42 MG/DL
HEMOGLOBIN: 12.9 GM/DL (ref 13.5–18)
IMMATURE NEUTROPHIL %: 0.3 % (ref 0–0.43)
LDLC SERPL CALC-MCNC: 154 MG/DL
LYMPHOCYTES ABSOLUTE: 1.7 K/CU MM
LYMPHOCYTES RELATIVE PERCENT: 22.3 % (ref 24–44)
MAGNESIUM: 2.1 MG/DL (ref 1.8–2.4)
MCH RBC QN AUTO: 29.3 PG (ref 27–31)
MCHC RBC AUTO-ENTMCNC: 33.3 % (ref 32–36)
MCV RBC AUTO: 88 FL (ref 78–100)
MONOCYTES ABSOLUTE: 0.9 K/CU MM
MONOCYTES RELATIVE PERCENT: 11.2 % (ref 0–4)
NEUTROPHILS ABSOLUTE: 4.6 K/CU MM
NEUTROPHILS RELATIVE PERCENT: 59.4 % (ref 36–66)
NUCLEATED RBC %: 0 %
PDW BLD-RTO: 13.1 % (ref 11.7–14.9)
PHOSPHORUS: 3.5 MG/DL (ref 2.5–4.9)
PLATELET # BLD: 200 K/CU MM (ref 140–440)
PMV BLD AUTO: 9.9 FL (ref 7.5–11.1)
POTASSIUM SERPL-SCNC: 3.8 MMOL/L (ref 3.5–5.1)
RBC # BLD: 4.4 M/CU MM (ref 4.6–6.2)
SODIUM BLD-SCNC: 140 MMOL/L (ref 135–145)
TOTAL IMMATURE NEUTOROPHIL: 0.02 K/CU MM
TOTAL NUCLEATED RBC: 0 K/CU MM
TRIGL SERPL-MCNC: 106 MG/DL
WBC # BLD: 7.7 K/CU MM (ref 4–10.5)

## 2024-07-03 PROCEDURE — 97116 GAIT TRAINING THERAPY: CPT

## 2024-07-03 PROCEDURE — 94150 VITAL CAPACITY TEST: CPT

## 2024-07-03 PROCEDURE — 84100 ASSAY OF PHOSPHORUS: CPT

## 2024-07-03 PROCEDURE — 2000000000 HC ICU R&B

## 2024-07-03 PROCEDURE — 2580000003 HC RX 258: Performed by: NURSE PRACTITIONER

## 2024-07-03 PROCEDURE — 97162 PT EVAL MOD COMPLEX 30 MIN: CPT

## 2024-07-03 PROCEDURE — 6370000000 HC RX 637 (ALT 250 FOR IP): Performed by: NURSE PRACTITIONER

## 2024-07-03 PROCEDURE — 85025 COMPLETE CBC W/AUTO DIFF WBC: CPT

## 2024-07-03 PROCEDURE — 80061 LIPID PANEL: CPT

## 2024-07-03 PROCEDURE — 93010 ELECTROCARDIOGRAM REPORT: CPT | Performed by: INTERNAL MEDICINE

## 2024-07-03 PROCEDURE — 70450 CT HEAD/BRAIN W/O DYE: CPT

## 2024-07-03 PROCEDURE — 97166 OT EVAL MOD COMPLEX 45 MIN: CPT

## 2024-07-03 PROCEDURE — 97535 SELF CARE MNGMENT TRAINING: CPT

## 2024-07-03 PROCEDURE — 94761 N-INVAS EAR/PLS OXIMETRY MLT: CPT

## 2024-07-03 PROCEDURE — 93306 TTE W/DOPPLER COMPLETE: CPT

## 2024-07-03 PROCEDURE — 83036 HEMOGLOBIN GLYCOSYLATED A1C: CPT

## 2024-07-03 PROCEDURE — 94664 DEMO&/EVAL PT USE INHALER: CPT

## 2024-07-03 PROCEDURE — 80048 BASIC METABOLIC PNL TOTAL CA: CPT

## 2024-07-03 PROCEDURE — 99223 1ST HOSP IP/OBS HIGH 75: CPT | Performed by: STUDENT IN AN ORGANIZED HEALTH CARE EDUCATION/TRAINING PROGRAM

## 2024-07-03 PROCEDURE — 6360000002 HC RX W HCPCS: Performed by: NURSE PRACTITIONER

## 2024-07-03 PROCEDURE — 83735 ASSAY OF MAGNESIUM: CPT

## 2024-07-03 PROCEDURE — 93306 TTE W/DOPPLER COMPLETE: CPT | Performed by: INTERNAL MEDICINE

## 2024-07-03 RX ORDER — ROSUVASTATIN CALCIUM 20 MG/1
40 TABLET, COATED ORAL NIGHTLY
Status: DISCONTINUED | OUTPATIENT
Start: 2024-07-03 | End: 2024-07-05 | Stop reason: HOSPADM

## 2024-07-03 RX ORDER — ROSUVASTATIN CALCIUM 20 MG/1
20 TABLET, COATED ORAL NIGHTLY
Status: DISCONTINUED | OUTPATIENT
Start: 2024-07-03 | End: 2024-07-03

## 2024-07-03 RX ADMIN — ASPIRIN 81 MG: 81 TABLET, CHEWABLE ORAL at 20:52

## 2024-07-03 RX ADMIN — SODIUM CHLORIDE, PRESERVATIVE FREE 10 ML: 5 INJECTION INTRAVENOUS at 20:53

## 2024-07-03 RX ADMIN — SODIUM CHLORIDE, PRESERVATIVE FREE 5 ML: 5 INJECTION INTRAVENOUS at 10:46

## 2024-07-03 RX ADMIN — ENOXAPARIN SODIUM 40 MG: 100 INJECTION SUBCUTANEOUS at 20:52

## 2024-07-03 RX ADMIN — ROSUVASTATIN CALCIUM 40 MG: 20 TABLET, COATED ORAL at 20:52

## 2024-07-03 ASSESSMENT — ENCOUNTER SYMPTOMS
SHORTNESS OF BREATH: 0
WHEEZING: 0
CONSTIPATION: 0
SORE THROAT: 0
SINUS PAIN: 0
VOICE CHANGE: 0
TROUBLE SWALLOWING: 0
DIARRHEA: 0
COUGH: 0
COLOR CHANGE: 0
BACK PAIN: 0
CHEST TIGHTNESS: 0
VOMITING: 0
ABDOMINAL PAIN: 0
NAUSEA: 0
SINUS PRESSURE: 0

## 2024-07-03 NOTE — PROGRESS NOTES
I have personally seen and examined the patient independently. I have reviewed the patient's available data including medical history and recent test results.  I assume responsibility for the patient's care and management. I reviewed and discussed note as documented by PRADEEP.  I agree with the physical exam findings, assessment and plan. My documented MDM is a substantive portion of the supervisory note.     Stroke, question right MCA distribution, status post TNK, noted improvement of neurologic function  Coronary artery disease per history  Hypertension per history    Serial neurologic evaluations plus serial follow-up brain imaging per protocol  Statin, addition of aspirin (on follow-up imaging verifies absence of intracranial bleed)  DVT prophylaxis 1 follow-up imaging verifies absence of intracranial bleed    Complex decisions required for evaluation management, reviewed during critical care rounds      I reviewed pertinent laboratory data imaging studies    Awake alert no acute distress vitals reviewed.  Head normal.  No obvious facial asymmetry.  Oral pharyngeal exam unremarkable.  Neck absence of bruits.  Chest exam clear fields auscultation.  Regular rate rhythm subtle gallop.  Abdomen soft nontender bowel sounds throughout.  Extremities are warm to palpation intact pulses.  Concise speech, minimal residual left upper extremity weakness relative to other extremities.  No sensory deficits.  Absence of skin rash or eruption.      E [place]as[place]  [phone]

## 2024-07-03 NOTE — FLOWSHEET NOTE
4 Eyes Skin Assessment     NAME:  Shane Padilla  YOB: 1955  MEDICAL RECORD NUMBER:  [id number]    The patient is being assessed for  Admission    I agree that at least one RN has performed a thorough Head to Toe Skin Assessment on the patient. ALL assessment sites listed below have been assessed.      Areas assessed by both nurses:    Head, Face, Ears, Shoulders, Back, Chest, Arms, Elbows, Hands, Sacrum. Buttock, Coccyx, Ischium, Legs. Feet and Heels, and Under Medical Devices         Does the Patient have a Wound? No noted wound(s)       Law Prevention initiated by RN: Yes  Wound Care Orders initiated by RN: No    Pressure Injury (Stage 3,4, Unstageable, DTI, NWPT, and Complex wounds) if present, place Wound referral order by RN under : No    New Ostomies, if present place, Ostomy referral order under : No     Nurse 1 eSignature: Electronically signed by Maria Del Carmen Osborne RN on 7/3/24 at 6:37 PM EDT    **SHARE this note so that the co-signing nurse can place an eSignature**    Nurse 2 eSignature: Electronically signed by Yael Uribe RN on 7/3/24 at 6:54 PM EDT

## 2024-07-03 NOTE — CONSULTS
[facility] ACUTE CARE PHYSICAL THERAPY EVALUATION  Shane Padilla, 1955, 2128/2128-A, 7/3/2024    History  [place]:  The primary encounter diagnosis was Left arm weakness. Diagnoses of Ataxia of left upper extremity, Slurred speech, and Cerebrovascular accident (CVA), unspecified mechanism (HCC) were also pertinent to this visit.  Patient  has a past medical history of Chest pain, H/O echocardiogram, History of cardiac monitoring, History of Holter monitoring, HTN (hypertension), and Hx of cardiovascular stress test.  Patient  has a past surgical history that includes Tonsillectomy; Colonoscopy (2009); Colonoscopy (05/22/2019); and Colonoscopy (N/A, 5/22/2019).    Recommendation: Home with outpatient PT and PRN assist    Subjective:  Patient states: \"I feel like I'm 75 percent\".    Pain:  denies.    Communication with other providers:  RN approval for PT session, co-eval with OT oumou Moya and OT Carmela  Restrictions: general precautions, falls    Home Setup/Prior level of function  Social/Functional History  Lives With: Spouse (and 4 yo relative)  Type of Home: House  Home Layout: Multi-level, Bed/Bath upstairs, Laundry in basement  Home Access: Stairs to enter with rails  Entrance Stairs - Number of Steps: 7  Entrance Stairs - Rails: Left  Bathroom Shower/Tub: Walk-in shower  Bathroom Equipment: Shower chair  Home Equipment: Walker - Rolling  Has the patient had two or more falls in the past year or any fall with injury in the past year?: No  Receives Help From: Family, Friend(s)  ADL Assistance: Independent  Homemaking Assistance: Independent  Homemaking Responsibilities: Yes  Ambulation Assistance: Independent (uses no AD)  Transfer Assistance: Independent  Active : Yes  Occupation: Retired  Type of Occupation:     Examination of body systems (includes body structures/functions, activity/participation limitations):  Observation:  Supine in bed upon arrival with  burden of care, and restore PLOF.    Complexity: Moderate  Prognosis: Good, no significant barriers to participation at this time.    General Plan:  (3+ times per week)  Equipment: continue to assess    Goals:  Short Term Goals  Time Frame for Short Term Goals: 2 weeks  Short Term Goal 1: Pt will complete supine <> sit independent  Short Term Goal 2: Pt will complete sit <> stand independent  Short Term Goal 3: Pt will ambulate 450 ft without AD, SBA  Short Term Goal 4: Pt will complete light dynamic standing activity x3 minutes with single to no UE support, supervision  Short Term Goal 5: Pt will complete 7 steps with single handrail, SBA       Treatment plan:  Bed mobility, transfers, balance, gait, TA, TX, stairs    Recommendations for NURSING mobility: CGA, gait belt    Time:   Time in: 1058  Time out: 1121  Timed treatment minutes: 10  Total time: 23    Electronically signed by:    Priscilla Henderson, PT  7/3/2024, 4:40 PM

## 2024-07-03 NOTE — PROGRESS NOTES
Occupational Therapy  [facility] ACUTE CARE OCCUPATIONAL THERAPY EVALUATION    Shane Padilla, 1955, 2128/2128-A, 7/3/2024    Discharge Recommendation: Home with initial 24 hour supervision/assistance, Outpatient OT services at discharge    History:  [place]':  The primary encounter diagnosis was Left arm weakness. Diagnoses of Ataxia of left upper extremity, Slurred speech, and Cerebrovascular accident (CVA), unspecified mechanism (HCC) were also pertinent to this visit.    Subjective:  Patient states: \"I wasn't able to get these socks on yesterday\"  Pain: Pt denied pain this date  Communication with other providers: RN Maria Del Carmen, PT Priscilla  Restrictions: General Precautions, Fall Risk, Telemetry, Bed/Chair Alarm    Home Setup/Prior level of function:  Social/Functional History  Lives With: Spouse (and 4 yo relative)  Type of Home: House  Home Layout: Multi-level, Bed/Bath upstairs, Laundry in basement  Home Access: Stairs to enter with rails  Entrance Stairs - Number of Steps: 7  Entrance Stairs - Rails: Left  Bathroom Shower/Tub: Walk-in shower  Bathroom Equipment: Shower chair  Home Equipment: Walker - Rolling  Has the patient had two or more falls in the past year or any fall with injury in the past year?: No  Receives Help From: Family, Friend(s)  ADL Assistance: Independent  Homemaking Assistance: Independent  Homemaking Responsibilities: Yes  Ambulation Assistance: Independent (uses no AD)  Transfer Assistance: Independent  Active : Yes  Occupation: Retired  Type of Occupation:     Examination:  Observation: Supine in bed with wife in room upon arrival, agreeable to session  Vision: WFL, glasses  Hearing: WFL  Vitals: HR up to 100 in standing at sink for ADLs    Body Systems and functions:  ROM: WFL in all joints in BL UEs  Strength: 4+/5 in all major muscle groups in BL UEs   Sensation: WFL  Tone: Normal  Coordination: no significant dysmetria/dyskinesia noted with  formal finger to nose testing, increased time required for LUE  Perception: WNL    Activities of Daily Living (ADLs):  Feeding: IND  Grooming: SBA seated  UB bathing: SBA  LB bathing: CGA  UB dressing: SBA  LB dressing: CGA (SBA seated to chandu BL socks using figure 4 technique)  Toileting: CGA    Cognitive and Psychosocial Functioning:  Overall cognitive status: WFL  Affect: Normal     Balance:   Sitting: SBA for static and dynamic sitting balance unsupported at EOB  Standing: CGA progressing to SBA with and without RW    Functional Mobility:  Bed Mobility: CGA supine to sit to EOB  Transfers: CGA STS from EOB, SBA STS from chair, SBA stand to sit to chair x2 attempts  Ambulation: SBA using RW ~200', CGA using no AD ~200'. Pt demo greater stability using RW but no major LOB noted without RW. See PT note for further details regarding ambulation.      AM-PAC 6 click short form for inpatient daily activity:   How much help from another person does the patient currently need... Unable  Dep A Lot  Max A A Lot   Mod A A Little  Min A A Little   CGA  SBA None   Mod I  Indep  Sup   1.  Putting on and taking off regular lower body clothing? [] 1    [] 2   [] 2   [] 3   [x] 3   [] 4      2. Bathing (including washing, rinsing, drying)? [] 1   [] 2   [] 2 [] 3 [x] 3 [] 4   3. Toileting, which includes using toilet, bedpan, or urinal? [] 1    [] 2   [] 2   [] 3   [x] 3   [] 4     4. Putting on and taking off regular upper body clothing? [] 1   [] 2   [] 2   [] 3   [x] 3    [] 4      5. Taking care of personal grooming such as brushing teeth? [] 1   [] 2    [] 2 [] 3    [x] 3   [] 4      6. Eating meals?   [] 1   [] 2   [] 2   [] 3   [] 3   [x] 4      Raw Score:  19     [24=0% impaired(CH), 23=1-19%(CI), 20-22=20-39%(CJ), 15-19=40-59%(CK), 10-14=60-79%(CL), 7-9=80-99%(CM), 6=100%(CN)]     Treatment:  Self Care Training:   Cues were given for safety, sequence, UE/LE placement, visual cues, and balance.    Activities performed

## 2024-07-03 NOTE — FLOWSHEET NOTE
Pt independently ambulated in hallway x 400ft. Tolerated well. States he jut doesn't feel like he has all his strength. Returned to bed. Denies further needs. Call light within reach

## 2024-07-03 NOTE — CARE COORDINATION
Chart reviewed, spoke with pt in room. From home with wife. Independent of ADLs. Has PCP and insurance. Wife supports. Have legal guardianship of great nephew (5 yrs old). Denies needs.   07/03/24 0828   Service Assessment   Patient Orientation Alert and Oriented   Cognition Alert   History Provided By Patient;Medical Record   Primary Caregiver Self   Accompanied By/Relationship clergy (with pt's consent)   Support Systems Spouse/Significant Other;Children;Family Members;Voodoo/Ping Community   Patient's Healthcare Decision Maker is: Legal Next of Kin   PCP Verified by CM Yes   Prior Functional Level Independent in ADLs/IADLs   Current Functional Level Independent in ADLs/IADLs   Can patient return to prior living arrangement Yes   Ability to make needs known: Good   Family able to assist with home care needs: Yes   Would you like for me to discuss the discharge plan with any other family members/significant others, and if so, who? Yes  (family if needed)   Financial Resources Medicare   Community Resources None   Social/Functional History   Lives With Spouse

## 2024-07-03 NOTE — CONSULTS
Neurology Service Consult Note  [facility]   Patient Name: Shane Padilla  : 1955        Subjective:   Reason for consult: Stroke s/p TNK  68 y.o.  male with history of chest pain, HTN, presenting to [facility] with stroke like symptoms. Patient was working in his yard when he developed right eye pain. He then noticed feeling off balance and had difficulty walking into his house. He noted decreased sensation in the left arm. Speech was felt to be slurred. Stroke alert was called and patient was evaluated by [facility] tele neurology service. Initial NIH 5. TNK was recommended, patient received dose 24 at 1745.     Chart was reviewed in detail, patient was seen and assessed. He is awake, resting in bed. Wife is at bedside. Patient tells me that day before yesterday while working in the yard he developed burning of the right eye and weakness of the extremities, left greater than right. He went indoors when symptoms started but they resolved after minutes and he was able to go back to his yard work. Yesterday, patient was again working in the yard when he again developed burning sensation in the right eye and gait imbalance. This time symptoms did not resolve and then patient developed numbness in the right arm. Wife feels that his speech was slurred but did not appreciate facial weakness. This prompted him to come to the ED. Patient received TNK and tells me that his symptoms started to improve after about an hour but did not resolve until this AM. He does tell me that overnight the numbness in his left arm became more pins and needles before sensation returned. At home patient is not on aspirin or statin. He tells me that he has taken atorvastatin in the past but it was discontinued due to severe myalgia.     Past Medical History:   Diagnosis Date    Chest pain     H/O echocardiogram 05/18/15    EF 55-60% Normal LV and systolic function. Normal Echo.     History of  (7/2/2024)    PRAPARE - Transportation     Lack of Transportation (Medical): No     Lack of Transportation (Non-Medical): No   Physical Activity: Not on file   Stress: Not on file   Social Connections: Not on file   Intimate Partner Violence: Not on file   Housing Stability: Low Risk  (7/2/2024)    Housing Stability Vital Sign     Unable to Pay for Housing in the Last Year: No     Number of Places Lived in the Last Year: 1     Unstable Housing in the Last Year: No      Family History   Problem Relation Age of Onset    Heart Attack Father 70    High Blood Pressure Father     High Blood Pressure Mother     Elevated Lipids Mother     High Blood Pressure Brother          ROS (10 systems)  In addition to that documented in the HPI above, the additional ROS was obtained:  Constitutional: Denies fevers or chills  Eyes: Denies vision changes  ENMT: Denies sore throat  CV: Denies chest pain  Resp: Denies SOB  GI: Denies vomiting or diarrhea  : Denies painful urination  MSK: Denies recent trauma  Skin: Denies new rashes  Neuro: slurred speech, left weakness left arm numbness, ataxia  Endocrine: Denies unexpected weight loss  Heme: Denies bleeding disorders    Physical Exam:         Wt Readings from Last 3 Encounters:   07/02/24 104.3 kg (230 lb)   07/02/24 101.3 kg (223 lb 5.2 oz)   07/12/21 111.8 kg (246 lb 6.4 oz)     Temp Readings from Last 3 Encounters:   07/03/24 98 °F (36.7 °C) (Oral)   05/22/19 97 °F (36.1 °C) (Temporal)     BP Readings from Last 3 Encounters:   07/03/24 (!) 153/112   07/12/21 (!) 138/90   06/14/21 124/80     Pulse Readings from Last 3 Encounters:   07/03/24 71   07/12/21 82   06/14/21 92      NIH Stroke Scale  Interval: 15 hours post TNK  Person Administering Scale: ALOK Dye CNP     1a  Level of consciousness: 0=alert; keenly responsive   1b. LOC questions:  0=Performs both tasks correctly   1c. LOC commands: 0=Performs both tasks correctly   2.  Best Gaze: 0=normal   3.  Visual: 0=No

## 2024-07-03 NOTE — PROGRESS NOTES
V2.0  [facility] Hospitalist Progress Note      Name:  Shane Padilla /Age/Sex: 1955  (68 y.o. male)   MRN & CSN:  [id number] & [id number] Encounter Date/Time: 7/3/2024 9:28 AM EDT    Location:  -A PCP: Erasmo Servin MD       Hospital Day: 2    Assessment and Plan:   Shane Padilla is a 68 y.o. male with a history of CAD, hypertension who presented to [facility] 2024 with strokelike symptoms, slurred speech, left upper extremity weakness, status post TNK admitted to the ICU for monitoring       Plan:  Acute strokelike symptoms s/p TNK ~ 5pm 24  Mild left-sided facial droop, mild dysarthria, tingling left digits-POA- resolved  CAD  HTN    Neuro: Presented with strokelike symptoms, slurred speech and left upper extremity weakness.  Last well-known at 1 PM  , (less than 4.5 hours from presentation), NIH stroke scale 5 status post TNK NIHSS improved to 1,  CT brain without contrast negative for any acute findings CTA negative for any acute findings/LVO.  Every hour neurochecks for the first 24 hrs Repeat CT scan in 24 hours, MRI pending.  Neurology consulted.  Stroke secondary prevention- aspirin to be started after 24 hr CT head is  neg for hemorrhage, cont statin, HbA1C 5.4, lipid profile-reviewed  Cardio:  Hx of HTN.  Monitor vitals.  SBP Goal < 180mmHg. F/u Echo  Resp: on RA, continue inhalers and nebs, continue pulm toileting  GI: ADAT, GI ppx  : Cr 0.8, monitor uop. Monitor electrolytes and replenish as needed  Heme: Hgb 12.6, plts 200k. DVT ppx: loevnox  ID: WBC 7.7, non infectious. Monitor clinically  Endo: POC BG ISS PRN. Maintain euglycemia, avoid hypoglycemia  MSK: DTI prevention. OOBTC.      Tubes/Lines/Drains:  PIV     Code Status: Full      Diet ADULT DIET; Regular   DVT Prophylaxis [x] Lovenox, []  Heparin, [] SCDs, [] Ambulation,  [] Eliquis, [] Xarelto  [] Coumadin   Code Status Full Code   Disposition From: home  Expected Disposition: home  Estimated Date of Discharge:  fracture. PARANASAL SINUSES / MASTOIDS: No acute sinusitis or mastoiditis. ORBITS: Normal. EXTRACRANIAL SOFT TISSUES: Normal. OTHER: None. IMPRESSION: 1. Normal exam Electronically signed by Kavon Parikh    CT Head W/O Contrast    Result Date: 7/2/2024  EXAM: CT CODE NEURO HEAD WO CONTRAST Sagittal and coronal reformatted images were reviewed. CT imaging performed at this location utilizes radiation dose optimization techniques which include one or more of the following: -Automated exposure control -Adjustment of the mA and/or kV according to patient size -Use of iterative reconstruction technique CT Radiation Dose DLP mGy-cm INDICATION: 68 years old Male stroke alert COMPARISON: None. FINDINGS: There are no abnormal extra-axial fluid collections or masses. There is generalized volume loss. The ventricles and sulci are otherwise normal in size and configuration. There is no midline shift. The gray-white matter differentiation is preserved. The orbits appear normal. The paranasal sinuses are clear. The mastoid air cells are clear. There are bilateral periventricular white matter hypodensities.     There is no acute abnormality. There are chronic small vessel disease. Electronically signed by Kavon Parikh      Electronically signed by Ciara Crodova PA-C on 7/3/2024 at 9:28 AM

## 2024-07-03 NOTE — CONSULTS
V2.0  [facility] Consult Note      Name:  Shane Padilla /Age/Sex: 1955  (68 y.o. male)   MRN & CSN:  [id number] & [id number] Encounter Date/Time: 7/3/2024 1:06 PM EDT   Location:  -A PCP: Erasmo Servin MD     Attending:Edgar Morales,*  Consulting Provider: Edgar Morales MD      Hospital Day: 2    History Obtained From:    patient    History of Present Illness:     Chief Complaint: Acute CVA (cerebrovascular accident) (HCC)    Shane Padilla is a 68 y.o. male who is seen today by the hospitalist team at the request of Dr. Hodge, with a Chief Complaint of strokelike symptoms.  Patient presented with slurred speech as well as left-sided weakness and was assessed by telestroke.  Deemed a TNK candidate and received the medication.  On evaluation today patient states that his speech is returned to normal and he does not have any continued weakness.  Overall patient states that he is doing well.  Is pending his MRI.     Assessment and Recommendations   Shane Padilla is a 68 y.o. male  who presents with Acute CVA (cerebrovascular accident) (HCC)        Recommendations:    Strokelike symptoms  Patient did receive TNK.  CT and CTA were okay.  Neurology consulted  Follow-up MRI  Follow-up 24-hour stability scan  Aspirin and statin  Continue telemetry  Hold home BP meds to allow for permissive HTN    Hyperlipidemia  LDL elevated to 154  Initiated Crestor    Hypertension  Patient takes lisinopril.  Currently holding    Diet ADULT DIET; Regular   DVT Prophylaxis [] Lovenox, []  Heparin, [] SCDs, [x] Ambulation,    [] Eliquis, [] Xarelto [] Other:   Code Status Full Code   Surrogate Decision Maker/ POA       Personally reviewed Lab Studies and Imaging     Discussed management of the case with critical care who recommended stability at 24 hours and MRI    EKG interpreted personally and results no acute ST changes    Imaging that was interpreted personally includes CT head and results no  clear. The mastoid air cells are clear. There are bilateral periventricular white matter hypodensities.     There is no acute abnormality. There are chronic small vessel disease. Electronically signed by Kavon Parikh      CBC:   Recent Labs     07/02/24  1726 07/03/24  0420   WBC 8.2 7.7   HGB 12.6* 12.9*    200     BMP:    Recent Labs     07/02/24  1726 07/03/24  0420    140   K 4.1 3.8    103   CO2 24 25   BUN 24* 19   CREATININE 1.0 0.8*   GLUCOSE 122* 96     Hepatic:   Recent Labs     07/02/24  1726   AST 18   ALT 21   BILITOT 0.5   ALKPHOS 54     Lipids:   Lab Results   Component Value Date/Time    CHOL 217 07/03/2024 04:20 AM    HDL 42 07/03/2024 04:20 AM    TRIG 106 07/03/2024 04:20 AM     Hemoglobin A1C:   Lab Results   Component Value Date/Time    LABA1C 5.4 07/03/2024 04:20 AM     TSH: No results found for: \"TSH\"  Troponin:   Lab Results   Component Value Date/Time    TROPONINT <0.010 10/10/2014 10:38 PM     Lactic Acid: No results for input(s): \"LACTA\" in the last 72 hours.  BNP: No results for input(s): \"PROBNP\" in the last 72 hours.  UA:No results found for: \"NITRU\", \"COLORU\", \"PHUR\", \"LABCAST\", \"WBCUA\", \"RBCUA\", \"MUCUS\", \"TRICHOMONAS\", \"YEAST\", \"BACTERIA\", \"CLARITYU\", \"SPECGRAV\", \"LEUKOCYTESUR\", \"UROBILINOGEN\", \"BILIRUBINUR\", \"BLOODU\", \"GLUCOSEU\", \"KETUA\", \"AMORPHOUS\"  Urine Cultures: No results found for: \"LABURIN\"  Blood Cultures: No results found for: \"BC\"  No results found for: \"BLOODCULT2\"  Organism: No results found for: \"ORG\"    Comment: Please note this report has been produced using speech recognition software and may contain errors related to that system including errors in grammar, punctuation, and spelling, as well as words and phrases that may be inappropriate. If there are any questions or concerns please feel free to contact the dictating provider for clarification.     Electronically signed by Edgar Morales MD on 7/3/2024 at 1:06 PM

## 2024-07-04 ENCOUNTER — APPOINTMENT (OUTPATIENT)
Dept: MRI IMAGING | Age: 69
End: 2024-07-04
Payer: MEDICARE

## 2024-07-04 LAB
ANION GAP SERPL CALCULATED.3IONS-SCNC: 12 MMOL/L (ref 7–16)
BASOPHILS ABSOLUTE: 0 K/CU MM
BASOPHILS RELATIVE PERCENT: 0.5 % (ref 0–1)
BUN SERPL-MCNC: 18 MG/DL (ref 6–23)
CALCIUM SERPL-MCNC: 9.4 MG/DL (ref 8.3–10.6)
CHLORIDE BLD-SCNC: 105 MMOL/L (ref 99–110)
CO2: 23 MMOL/L (ref 21–32)
CREAT SERPL-MCNC: 0.9 MG/DL (ref 0.9–1.3)
DIFFERENTIAL TYPE: ABNORMAL
EOSINOPHILS ABSOLUTE: 0.7 K/CU MM
EOSINOPHILS RELATIVE PERCENT: 8.8 % (ref 0–3)
GFR, ESTIMATED: >90 ML/MIN/1.73M2
GLUCOSE BLD-MCNC: 205 MG/DL (ref 70–99)
GLUCOSE SERPL-MCNC: 96 MG/DL (ref 70–99)
HCT VFR BLD CALC: 39.6 % (ref 42–52)
HEMOGLOBIN: 13.4 GM/DL (ref 13.5–18)
IMMATURE NEUTROPHIL %: 0.1 % (ref 0–0.43)
LYMPHOCYTES ABSOLUTE: 1.8 K/CU MM
LYMPHOCYTES RELATIVE PERCENT: 23.7 % (ref 24–44)
MAGNESIUM: 2 MG/DL (ref 1.8–2.4)
MCH RBC QN AUTO: 29.8 PG (ref 27–31)
MCHC RBC AUTO-ENTMCNC: 33.8 % (ref 32–36)
MCV RBC AUTO: 88.2 FL (ref 78–100)
MONOCYTES ABSOLUTE: 0.9 K/CU MM
MONOCYTES RELATIVE PERCENT: 11.8 % (ref 0–4)
NEUTROPHILS ABSOLUTE: 4.3 K/CU MM
NEUTROPHILS RELATIVE PERCENT: 55.1 % (ref 36–66)
NUCLEATED RBC %: 0 %
PDW BLD-RTO: 13.1 % (ref 11.7–14.9)
PHOSPHORUS: 3.5 MG/DL (ref 2.5–4.9)
PLATELET # BLD: 191 K/CU MM (ref 140–440)
PMV BLD AUTO: 9.6 FL (ref 7.5–11.1)
POTASSIUM SERPL-SCNC: 3.8 MMOL/L (ref 3.5–5.1)
RBC # BLD: 4.49 M/CU MM (ref 4.6–6.2)
SODIUM BLD-SCNC: 140 MMOL/L (ref 135–145)
TOTAL IMMATURE NEUTOROPHIL: 0.01 K/CU MM
TOTAL NUCLEATED RBC: 0 K/CU MM
WBC # BLD: 7.7 K/CU MM (ref 4–10.5)

## 2024-07-04 PROCEDURE — 99233 SBSQ HOSP IP/OBS HIGH 50: CPT | Performed by: NURSE PRACTITIONER

## 2024-07-04 PROCEDURE — 94761 N-INVAS EAR/PLS OXIMETRY MLT: CPT

## 2024-07-04 PROCEDURE — 2580000003 HC RX 258: Performed by: NURSE PRACTITIONER

## 2024-07-04 PROCEDURE — 2000000000 HC ICU R&B

## 2024-07-04 PROCEDURE — 80048 BASIC METABOLIC PNL TOTAL CA: CPT

## 2024-07-04 PROCEDURE — 6370000000 HC RX 637 (ALT 250 FOR IP): Performed by: NURSE PRACTITIONER

## 2024-07-04 PROCEDURE — 6360000002 HC RX W HCPCS: Performed by: NURSE PRACTITIONER

## 2024-07-04 PROCEDURE — 70551 MRI BRAIN STEM W/O DYE: CPT

## 2024-07-04 PROCEDURE — 84100 ASSAY OF PHOSPHORUS: CPT

## 2024-07-04 PROCEDURE — 1200000000 HC SEMI PRIVATE

## 2024-07-04 PROCEDURE — 94150 VITAL CAPACITY TEST: CPT

## 2024-07-04 PROCEDURE — 82962 GLUCOSE BLOOD TEST: CPT

## 2024-07-04 PROCEDURE — 83735 ASSAY OF MAGNESIUM: CPT

## 2024-07-04 PROCEDURE — 85025 COMPLETE CBC W/AUTO DIFF WBC: CPT

## 2024-07-04 RX ORDER — ROSUVASTATIN CALCIUM 40 MG/1
40 TABLET, COATED ORAL NIGHTLY
Qty: 30 TABLET | Refills: 3 | Status: SHIPPED | OUTPATIENT
Start: 2024-07-04

## 2024-07-04 RX ORDER — LISINOPRIL 20 MG/1
20 TABLET ORAL DAILY
Qty: 30 TABLET | Refills: 1 | Status: SHIPPED | OUTPATIENT
Start: 2024-07-04

## 2024-07-04 RX ORDER — CLOPIDOGREL BISULFATE 75 MG/1
75 TABLET ORAL DAILY
Status: DISCONTINUED | OUTPATIENT
Start: 2024-07-04 | End: 2024-07-05 | Stop reason: HOSPADM

## 2024-07-04 RX ORDER — CLOPIDOGREL BISULFATE 75 MG/1
75 TABLET ORAL DAILY
Qty: 20 TABLET | Refills: 0 | Status: SHIPPED | OUTPATIENT
Start: 2024-07-05 | End: 2024-07-25

## 2024-07-04 RX ORDER — ASPIRIN 81 MG/1
81 TABLET, CHEWABLE ORAL NIGHTLY
Qty: 30 TABLET | Refills: 3 | Status: SHIPPED | OUTPATIENT
Start: 2024-07-04

## 2024-07-04 RX ADMIN — ASPIRIN 81 MG: 81 TABLET, CHEWABLE ORAL at 20:59

## 2024-07-04 RX ADMIN — SODIUM CHLORIDE, PRESERVATIVE FREE 10 ML: 5 INJECTION INTRAVENOUS at 08:23

## 2024-07-04 RX ADMIN — SODIUM CHLORIDE, PRESERVATIVE FREE 10 ML: 5 INJECTION INTRAVENOUS at 21:03

## 2024-07-04 RX ADMIN — CLOPIDOGREL BISULFATE 75 MG: 75 TABLET ORAL at 12:15

## 2024-07-04 RX ADMIN — ROSUVASTATIN CALCIUM 40 MG: 20 TABLET, COATED ORAL at 20:59

## 2024-07-04 RX ADMIN — ENOXAPARIN SODIUM 40 MG: 100 INJECTION SUBCUTANEOUS at 20:59

## 2024-07-04 ASSESSMENT — PAIN SCALES - GENERAL
PAINLEVEL_OUTOF10: 0
PAINLEVEL_OUTOF10: 0

## 2024-07-04 NOTE — PROGRESS NOTES
Smokeless tobacco: Never   Substance and Sexual Activity    Alcohol use: No     Comment: Caffeine: 1-2 cups caffeinated coffee daily.     Drug use: No    Sexual activity: Yes     Partners: Female   Other Topics Concern    Not on file   Social History Narrative    Not on file     Social Determinants of Health     Financial Resource Strain: Not on file   Food Insecurity: No Food Insecurity (7/2/2024)    Hunger Vital Sign     Worried About Running Out of Food in the Last Year: Never true     Ran Out of Food in the Last Year: Never true   Transportation Needs: No Transportation Needs (7/2/2024)    PRAPARE - Transportation     Lack of Transportation (Medical): No     Lack of Transportation (Non-Medical): No   Physical Activity: Not on file   Stress: Not on file   Social Connections: Not on file   Intimate Partner Violence: Not on file   Housing Stability: Low Risk  (7/2/2024)    Housing Stability Vital Sign     Unable to Pay for Housing in the Last Year: No     Number of Places Lived in the Last Year: 1     Unstable Housing in the Last Year: No      Family History   Problem Relation Age of Onset    Heart Attack Father 70    High Blood Pressure Father     High Blood Pressure Mother     Elevated Lipids Mother     High Blood Pressure Brother          ROS (10 systems)  In addition to that documented in the HPI above, the additional ROS was obtained:  Constitutional: Denies fevers or chills  Eyes: Denies vision changes  ENMT: Denies sore throat  CV: Denies chest pain  Resp: Denies SOB  GI: Denies vomiting or diarrhea  : Denies painful urination  MSK: Denies recent trauma  Skin: Denies new rashes  Neuro: slurred speech, left weakness left arm numbness, ataxia  Endocrine: Denies unexpected weight loss  Heme: Denies bleeding disorders    Physical Exam:         Wt Readings from Last 3 Encounters:   07/02/24 104.3 kg (230 lb)   07/02/24 101.3 kg (223 lb 5.2 oz)   07/12/21 111.8 kg (246 lb 6.4 oz)     Temp Readings from Last 3  Encounters:   24 97.6 °F (36.4 °C) (Oral)   19 97 °F (36.1 °C) (Temporal)     BP Readings from Last 3 Encounters:   24 (!) 158/87   21 (!) 138/90   21 124/80     Pulse Readings from Last 3 Encounters:   24 74   21 82   21 92      NIH Stroke Scale  Interval: 15 hours post TNK  Person Administering Scale: ALOK Dye CNP     1a  Level of consciousness: 0=alert; keenly responsive   1b. LOC questions:  0=Performs both tasks correctly   1c. LOC commands: 0=Performs both tasks correctly   2.  Best Gaze: 0=normal   3.  Visual: 0=No visual loss   4. Facial Palsy: 0=Normal symmetric movement   5a.  Motor left arm: 0=No drift, limb holds 90 (or 45) degrees for full 10 seconds   5b.  Motor right arm: 0=No drift, limb holds 90 (or 45) degrees for full 10 seconds   6a. motor left le=No drift, limb holds 90 (or 45) degrees for full 10 seconds   6b  Motor right le=No drift, limb holds 90 (or 45) degrees for full 10 seconds   7. Limb Ataxia: 0=Absent   8.  Sensory: 0=Normal; no sensory loss   9. Best Language:  0=No aphasia, normal   10. Dysarthria: 0=Normal   11. Extinction and Inattention: 0=No abnormality    Total:   0       Gen: A&O x 4, NAD, cooperative  HEENT: NC/AT, EOMI, PERRL, mmm,  neck supple, no meningeal signs  Heart: NSR  Lungs: Respirations even and unlabored  Ext: no edema, no calf tenderness b/l  Psych: normal mood and affect  Skin: no rashes or lesions    NEUROLOGIC EXAM:    Mental Status: A&O to self, location, month and year, NAD, speech mildly slurred/slow, language fluent, repetition and naming intact, follows commands appropriately    Cranial Nerve Exam:   CN II-XII: PERRL, VFF, no nystagmus, no gaze paresis, sensation V1-V3 intact b/l, muscles of facial expression symmetric; hearing intact to conversational tone, palate elevates symmetrically, shoulder elevation symmetric and tongue protrudes midline with movement side to side.    Motor Exam:        Strength 5/5 UE's/LE's b/l  Tone and bulk normal   No pronator drift    Deep Tendon Reflexes: 2/4 biceps, triceps, brachioradialis, patellar, and achilles b/l; flexor plantar responses b/l    Sensation: Intact light touch/pinprick/vibration UE's/LE's b/l    Coordination/Cerebellum:       Tremors--none      Rapidly alternating movements: no dysdiadochokinesia b/l                Heel-to-Shin: no dysmetria b/l      Finger-to-Nose: no dysmetria b/l    Gait and stance:      Gait: deferred      LABS:     Recent Labs     07/02/24  1726 07/03/24  0420 07/04/24  0420   WBC 8.2 7.7 7.7    140 140   K 4.1 3.8 3.8    103 105   CO2 24 25 23   BUN 24* 19 18   CREATININE 1.0 0.8* 0.9   GLUCOSE 122* 96 96   ALBUMIN 4.2  --   --    INR 1.1  --   --               IMAGING:    CT head w/o contrast:  IMPRESSION:  There is no acute abnormality.  There are chronic small vessel disease.    CTA head and neck:  IMPRESSION:     1. Normal exam    MRI brain w/o contrast:  Pending final read, on initial review evidence of acute right paramedian leonid stroke    Repeat CT head 24 hours post TNK  IMPRESSION:  1.No acute intracranial abnormality.    Echocardiogram:      Left Ventricle: Normal left ventricular systolic function with a visually estimated EF of 50 - 55%. Left ventricle size is normal. Normal wall thickness. Normal wall motion.    No significant valvular abnormalities.    Interatrial Septum: Agitated saline study was negative with and without provocation.    Pericardium: No pericardial effusion.    All imaging was personally reviewed    ASSESSMENT/PLAN:   68 year old male with acute onset of right eye burning, ataxic gait/lower extremity weakness greater on left, left arm weakness/numbness and slurred speech. Patient received TNK and within an hour started to have improvement in symptoms. It was several hours later (this am) before symptoms resolved. Today patient is back to his baseline. Today has slight slowing and

## 2024-07-04 NOTE — PROGRESS NOTES
Pt aware that MRI is close for holiday. Pt stated that he would like to go home today and do MRI out patient if MRI is the only reason he's still in the hospital. Dr. Banks notified who will check with Nerology and update pt. Pt agreed with plan.

## 2024-07-04 NOTE — PLAN OF CARE
Problem: Discharge Planning  Goal: Discharge to home or other facility with appropriate resources  7/4/2024 1011 by Kori Gillespie RN  Outcome: Progressing  7/4/2024 1011 by Kori Gillespie RN  Outcome: Progressing  7/4/2024 0347 by Michael Ramirez RN  Outcome: Progressing     Problem: ABCDS Injury Assessment  Goal: Absence of physical injury  7/4/2024 1011 by Kori Gillespie RN  Outcome: Progressing  7/4/2024 1011 by Kori Gillespie RN  Outcome: Progressing  7/4/2024 0347 by Michael Ramirez RN  Outcome: Progressing     Problem: Safety - Adult  Goal: Free from fall injury  7/4/2024 1011 by Kori Gillespie RN  Outcome: Progressing  7/4/2024 1011 by Kori Gillespie RN  Outcome: Progressing  7/4/2024 0347 by Michael Ramirez RN  Outcome: Progressing     Problem: Skin/Tissue Integrity  Goal: Absence of new skin breakdown  Description: 1.  Monitor for areas of redness and/or skin breakdown  2.  Assess vascular access sites hourly  3.  Every 4-6 hours minimum:  Change oxygen saturation probe site  4.  Every 4-6 hours:  If on nasal continuous positive airway pressure, respiratory therapy assess nares and determine need for appliance change or resting period.  Outcome: Progressing

## 2024-07-04 NOTE — DISCHARGE SUMMARY
V2.0  Discharge Summary    Name:  Shane Padilla /Age/Sex: 1955 (68 y.o. male)   Admit Date: 2024  Discharge Date: 24    MRN & CSN:  [id number] & [id number] Encounter Date and Time 24 2:07 PM EDT    Attending:  Edgar Morales,* Discharging Provider: Edgar Morales MD       Hospital Course:     Brief HPI: Shane Padilla is a 68 y.o. male past medical history of hypertension who presented with left upper extremity weakness and slurred speech.  Was reported doing yard work in the afternoon when his first noticed the symptoms which occurred at around 1 PM.  Presented to EMS, NIH stroke scale 5, was evaluated by stroke neurology and deemed a candidate for TNK.     Brief Problem Based Course:     Acute CVA  Patient did receive TNK.  CT and CTA were okay.  Neurology was consulted and MRI did not show any acute right pontine infarct.  Patient was started on aspirin, statin and will be discharged with 21 days of Plavix.  Referrals were made for outpatient PT, OT, and speech therapy.     Hyperlipidemia  LDL elevated to 154. Initiated Crestor     Hypertension  Continue lisinopril      The patient expressed appropriate understanding of, and agreement with the discharge recommendations, medications, and plan.     Consults this admission:  IP CONSULT TO NEUROLOGY  IP CONSULT TO HOSPITALIST    Discharge Diagnosis:     Acute CVA (cerebrovascular accident) (HCC)      Discharge Instruction:   Follow up appointments: neurology  Primary care physician: Erasmo Servin MD within 2 weeks  Diet: regular diet   Activity: activity as tolerated  Disposition: Discharged to:   [x]Home, [][facility], []SNF, []Acute Rehab, []Hospice  Condition on discharge: Stable  Labs and Tests to be Followed up as an outpatient by PCP or Specialist:     Discharge Medications:        Medication List        START taking these medications      aspirin 81 MG chewable tablet  Take 1 tablet by mouth at bedtime     clopidogrel 75  ARTERY: Normal. LEFT VERTEBRAL ARTERY: Normal. LEFT CAROTID ARTERY: Normal NECK SOFT TISSUES: No neck soft tissue mass or lymphadenopathy. VISUALIZED MEDIASTINUM: No mass or lymphadenopathy. VISUALIZED LUNG APICES: Clear. BONES: No destructive osseous process. OTHER: None.     1. Normal exam PROCEDURE: CT ANGIOGRAPHY HEAD WITH/WITHOUT CONTRAST INDICATION: Stroke COMPARISON: none TECHNIQUE: Axial CT imaging obtained through the head prior to and following administration of IV contrast. Axial images, multiplanar reformatted images, and maximum intensity projection images were reviewed for CT angiographic technique. IV contrast: mL Omnipaque 350. FINDINGS: ANTERIOR CIRCULATION: The intracranial internal carotid arteries, anterior cerebral arteries, and middle cerebral arteries demonstrate no occlusion or stenosis. No evidence for aneurysm or arteriovenous malformation. POSTERIOR CIRCULATION: The bilateral vertebral arteries, basilar artery and posterior cerebral arteries demonstrate no occlusion or stenosis. No evidence for aneurysm or arteriovenous malformation. INTRACRANIAL VENOUS SYSTEM: No evidence for intracranial venous thrombosis. INTRACRANIAL HEMORRHAGE: None. VENTRICLES: Normal in size and configuration for age. BRAIN PARENCHYMA: Gray-white matter differentiation is normal. No intracranial mass effect. SKULL: No destructive osseous process or fracture. PARANASAL SINUSES / MASTOIDS: No acute sinusitis or mastoiditis. ORBITS: Normal. EXTRACRANIAL SOFT TISSUES: Normal. OTHER: None. IMPRESSION: 1. Normal exam Electronically signed by Kavon Parikh    CT Head W/O Contrast    Result Date: 7/2/2024  EXAM: CT CODE NEURO HEAD WO CONTRAST Sagittal and coronal reformatted images were reviewed. CT imaging performed at this location utilizes radiation dose optimization techniques which include one or more of the following: -Automated exposure control -Adjustment of the mA and/or kV according to patient size -Use of iterative

## 2024-07-04 NOTE — PROGRESS NOTES
This RN notified pt about discharge order. Pt stated that he changed his mind and didn't think he was ready to go home yet, because he felt weaker and more unsteady today than yesterday. Pt stated willingness for ARU if quality. Dr. Morales notified and verbal order received for PT re-evaluation for possible ARU.

## 2024-07-05 ENCOUNTER — TELEPHONE (OUTPATIENT)
Age: 69
End: 2024-07-05

## 2024-07-05 VITALS
BODY MASS INDEX: 34.07 KG/M2 | SYSTOLIC BLOOD PRESSURE: 164 MMHG | DIASTOLIC BLOOD PRESSURE: 83 MMHG | OXYGEN SATURATION: 99 % | RESPIRATION RATE: 17 BRPM | TEMPERATURE: 98.1 F | HEIGHT: 69 IN | HEART RATE: 73 BPM | WEIGHT: 230 LBS

## 2024-07-05 PROCEDURE — 83735 ASSAY OF MAGNESIUM: CPT

## 2024-07-05 PROCEDURE — 94150 VITAL CAPACITY TEST: CPT

## 2024-07-05 PROCEDURE — 6370000000 HC RX 637 (ALT 250 FOR IP): Performed by: NURSE PRACTITIONER

## 2024-07-05 PROCEDURE — 97530 THERAPEUTIC ACTIVITIES: CPT

## 2024-07-05 PROCEDURE — 94761 N-INVAS EAR/PLS OXIMETRY MLT: CPT

## 2024-07-05 PROCEDURE — 85025 COMPLETE CBC W/AUTO DIFF WBC: CPT

## 2024-07-05 PROCEDURE — 2580000003 HC RX 258: Performed by: NURSE PRACTITIONER

## 2024-07-05 PROCEDURE — 84100 ASSAY OF PHOSPHORUS: CPT

## 2024-07-05 PROCEDURE — 97116 GAIT TRAINING THERAPY: CPT

## 2024-07-05 PROCEDURE — 80048 BASIC METABOLIC PNL TOTAL CA: CPT

## 2024-07-05 RX ADMIN — CLOPIDOGREL BISULFATE 75 MG: 75 TABLET ORAL at 08:18

## 2024-07-05 RX ADMIN — SODIUM CHLORIDE, PRESERVATIVE FREE 10 ML: 5 INJECTION INTRAVENOUS at 08:20

## 2024-07-05 ASSESSMENT — ENCOUNTER SYMPTOMS
ABDOMINAL PAIN: 0
VOMITING: 0
CONSTIPATION: 0
WHEEZING: 0
BACK PAIN: 0
COUGH: 0
SORE THROAT: 0
COLOR CHANGE: 0
VOICE CHANGE: 0
SHORTNESS OF BREATH: 0
SINUS PRESSURE: 0
SINUS PAIN: 0
DIARRHEA: 0
NAUSEA: 0
TROUBLE SWALLOWING: 0
CHEST TIGHTNESS: 0

## 2024-07-05 ASSESSMENT — PAIN SCALES - GENERAL: PAINLEVEL_OUTOF10: 0

## 2024-07-05 NOTE — PLAN OF CARE
Problem: Safety - Adult  Goal: Free from fall injury  Outcome: Progressing     Problem: Discharge Planning  Goal: Discharge to home or other facility with appropriate resources  7/4/2024 1410 by Kori Gillespie RN  Outcome: Adequate for Discharge  7/4/2024 1011 by Kori Gillespie RN  Outcome: Progressing  7/4/2024 1011 by Kori Gillespie RN  Outcome: Progressing     Problem: ABCDS Injury Assessment  Goal: Absence of physical injury  7/4/2024 1410 by Kori Gillespie RN  Outcome: Adequate for Discharge  7/4/2024 1011 by Kori Gillespie RN  Outcome: Progressing  7/4/2024 1011 by Kori Gillespie RN  Outcome: Progressing     Problem: Safety - Adult  Goal: Free from fall injury  7/4/2024 1410 by Kori Gillespie RN  Outcome: Adequate for Discharge  7/4/2024 1011 by Kori Gillespie RN  Outcome: Progressing  7/4/2024 1011 by Kori Gillespie RN  Outcome: Progressing     Problem: Skin/Tissue Integrity  Goal: Absence of new skin breakdown  Description: 1.  Monitor for areas of redness and/or skin breakdown  2.  Assess vascular access sites hourly  3.  Every 4-6 hours minimum:  Change oxygen saturation probe site  4.  Every 4-6 hours:  If on nasal continuous positive airway pressure, respiratory therapy assess nares and determine need for appliance change or resting period.  7/4/2024 1410 by Kori Gillespie RN  Outcome: Adequate for Discharge  7/4/2024 1011 by Kori Gillespie RN  Outcome: Progressing

## 2024-07-05 NOTE — PLAN OF CARE
Problem: Safety - Adult  Goal: Free from fall injury  7/5/2024 0906 by Nancy Yang, RN  Outcome: Progressing  7/4/2024 2200 by Stephanie Padron, RN  Outcome: Progressing     Problem: Chronic Conditions and Co-morbidities  Goal: Patient's chronic conditions and co-morbidity symptoms are monitored and maintained or improved  Outcome: Progressing

## 2024-07-05 NOTE — PROGRESS NOTES
Physical Therapy Treatment Note  Name: Shane Padilla MRN: [id number] :   1955   Date:  2024   Admission Date: 2024 Room:  [address] [place], [place] [address]     Restrictions/Precautions:          general precautions, fall risk    Communication with other providers:  RN OT    Subjective:  Patient states:  \"I just wanted your opinion again\"  Pain:   Location, Type, Intensity (0/10 to 10/10):  denies pain    Objective:    Observation:  pt standing in room independently upon arrival    Treatment, including education/measures:    Transfers: pt completed stand to sit to chair SBA    Gait: pt ambulated 400' without a device with decreased reanna, decreased step length bilaterally, but no LOB throughout. Cues provided for pathway    Assessment / Impression:    Pt up in chair at end of session with needs in reach and alarm on. MD requesting re-evaluation. No need for re-eval as no change in functional mobility status. Continue to recommend home.    Patient's tolerance of treatment:  well   Adverse Reaction: n/a  Significant change in status and impact:  n/a  Barriers to improvement:  decreased endurance    Plan for Next Session:    Continue to address high level balance in future sessions    Time in:  1137  Time out:  1147  Timed treatment minutes:  10  Total treatment time:  10    Previously filed items:  Social/Functional History  Lives With: Spouse (and 4 yo relative)  Type of Home: House  Home Layout: Multi-level, Bed/Bath upstairs, Laundry in basement  Home Access: Stairs to enter with rails  Entrance Stairs - Number of Steps: 7  Entrance Stairs - Rails: Left  Bathroom Shower/Tub: Walk-in shower  Bathroom Equipment: Shower chair  Home Equipment: Walker - Rolling  Has the patient had two or more falls in the past year or any fall with injury in the past year?: No  Receives Help From: Family, Friend(s)  ADL Assistance: Independent  Homemaking Assistance: Independent  Homemaking Responsibilities: Yes  Ambulation

## 2024-07-05 NOTE — TELEPHONE ENCOUNTER
Patient will need a follow up in the stroke clinic once discharged from the hospital. Can schedule with Sherley or Julianna - neither one seen patient in the hospital.

## 2024-07-05 NOTE — CARE COORDINATION
Pt has order for HC services for discharge. Pt is being discharged home today. Cm met with pt and offered list of HC agencies or if unfamiliar and would like to use [facility] that could be an option as well. Pt is unfamiliar with HC agencies and has decided to use [facility]. Perfect Serve message sent to [facility] liaison, Mary Beth Knott RN, re; pt discharge and and HC referral.

## 2024-07-05 NOTE — PLAN OF CARE
Problem: Safety - Adult  Goal: Free from fall injury  7/5/2024 1246 by Nancy Yang RN  Outcome: Completed  7/5/2024 0906 by Nancy Yang RN  Outcome: Progressing     Problem: Chronic Conditions and Co-morbidities  Goal: Patient's chronic conditions and co-morbidity symptoms are monitored and maintained or improved  7/5/2024 1246 by Nancy Yang RN  Outcome: Completed  7/5/2024 0906 by Nancy Yang RN  Outcome: Progressing

## 2024-07-05 NOTE — PROGRESS NOTES
[place]  [facility] [place]      Name:  Shane Padilla /Age/Sex: 1955  (68 y.o. male)   MRN & CSN:  [id number] & [id number] Encounter Date/Time: 2024 12:05 PM EDT    Location:  -A PCP: Erasmo Servin MD       Hospital Day: 4    Assessment and Plan:   Shane Padilla is a 68 y.o. male who presents with Acute CVA (cerebrovascular accident) (HCC)      Plan:    Acute CVA  Patient did receive TNK.  CT and CTA were okay.  Neurology was consulted and MRI did not show any acute right pontine infarct.  Patient was started on aspirin, statin and will be discharged with 21 days of Plavix.  Referrals were made for outpatient PT, OT, and speech therapy.     Hyperlipidemia  LDL elevated to 154. Initiated Crestor     Hypertension  Continue lisinopril    Diet ADULT DIET; Regular   DVT Prophylaxis [x] Lovenox, []  Heparin, [] SCDs, [] Ambulation,    [] Eliquis, [] Xarelto  [] Coumadin [] other   Code Status Full Code   Disposition From: Home  Expected Disposition: To be determined  Estimated Date of Discharge: Ready to be discharged pending disposition  Patient requires continued admission due to PT OT reeval   Surrogate Decision Maker/ POA      Personally reviewed Lab Studies and Imaging     Discussed management of the case with neurology who recommended reevaluate PFTs to see if he would benefit from rehab    EKG interpreted personally and results no acute ST changes    Imaging that was interpreted personally includes MRI and results acute CVA        Subjective:     Chief Complaint: Extremity Weakness (L upper and L lower extremity weakness)     Patient did feel more unsteady on his feet later in the day and I did ask PT/OT to reevaluate because he was not sure if he felt safe to go home.  Interested in rehab if he does qualify.    Review of Systems:    Review of Systems   Constitutional:  Negative for activity change, appetite change, chills, fatigue and fever.   HENT:  Negative for congestion,  hemorrhage is identified.     1. Acute right pontine infarct. No hemorrhage identified Electronically signed by Efren Chan    CT head without contrast    Result Date: 7/3/2024  EXAMINATION: CT HEAD WO CONTRAST, 7/3/2024 5:37 PM HISTORY: 24 hours post thrombolytic agent COMPARISON:  7/2/2024.  Technique: Axial images obtained without contrast. One or more of the following dose reduction techniques were used: automated exposure control, adjustment of the mA and/or kV according to patient size, use of iterative reconstruction technique. Findings: No acute infarct or parenchymal hemorrhage. No abnormal mass or mass effect. No midline shift. No extra-axial fluid collections. No hydrocephalus.  Remote bilateral basal ganglia lacunar infarcts Mastoid air cells unremarkable. Sinuses and orbits unremarkable. No acute fracture. No significant facial or scalp soft tissue swelling evident. No radiopaque foreign body is seen.     1.No acute intracranial abnormality. Electronically signed by Efren Chan      Comment: Please note this report has been produced using speech recognition software and may contain errors related to that system including errors in grammar, punctuation, and spelling, as well as words and phrases that may be inappropriate. If there are any questions or concerns please feel free to contact the dictating provider for clarification.     Electronically signed by Edgar Morales MD on 7/5/2024 at 12:05 PM

## 2024-07-05 NOTE — PROGRESS NOTES
V2.0  [facility] [place] Hospitalist Progress Note      Name:  Shane Padilla /Age/Sex: 1955  (68 y.o. male)   MRN & CSN:  [id number] & [id number] Encounter Date/Time: 2024 12:05 PM EDT    Location:  -A PCP: Erasmo Servin MD       Hospital Day: 4    Assessment and Plan:   Shane Padilla is a 68 y.o. male who presents with Acute CVA (cerebrovascular accident) (HCC)      Plan:    Acute CVA  Patient did receive TNK.  CT and CTA were okay.  Neurology was consulted and MRI did not show any acute right pontine infarct.  Patient was started on aspirin, statin and will be discharged with 21 days of Plavix.  Referrals were made for outpatient PT, OT, and speech therapy.     Hyperlipidemia  LDL elevated to 154. Initiated Crestor     Hypertension  Continue lisinopril    Diet ADULT DIET; Regular   DVT Prophylaxis [x] Lovenox, []  Heparin, [] SCDs, [] Ambulation,    [] Eliquis, [] Xarelto  [] Coumadin [] other   Code Status Full Code   Disposition From: Home  Expected Disposition: To be determined  Estimated Date of Discharge: Ready to be discharged pending disposition  Patient requires continued admission due to PT OT reeval   Surrogate Decision Maker/ POA      Personally reviewed Lab Studies and Imaging     Discussed management of the case with neurology who recommended reevaluate PFTs to see if he would benefit from rehab    EKG interpreted personally and results no acute ST changes    Imaging that was interpreted personally includes MRI and results acute CVA        Subjective:     Chief Complaint: Extremity Weakness (L upper and L lower extremity weakness)     Patient to work with PT/OT for dispo planning this morning. No new acute complaints.    Review of Systems:    Review of Systems   Constitutional:  Negative for activity change, appetite change, chills, fatigue and fever.   HENT:  Negative for congestion, hearing loss, sinus pressure, sinus pain, sore throat, trouble swallowing and voice change.    Eyes:   present.      Mental Status: He is alert and oriented to person, place, and time. Mental status is at baseline.   Psychiatric:         Mood and Affect: Mood normal.         Behavior: Behavior normal.         Thought Content: Thought content normal.         Medications:   Medications:    clopidogrel  75 mg Oral Daily    rosuvastatin  40 mg Oral Nightly    sodium chloride flush  5-40 mL IntraVENous 2 times per day    enoxaparin  40 mg SubCUTAneous Nightly    aspirin  81 mg Oral Nightly    Or    aspirin  300 mg Rectal Nightly    multivitamin  1 tablet Oral Daily      Infusions:    sodium chloride       PRN Meds: sodium chloride flush, 5-40 mL, PRN  sodium chloride, , PRN  ondansetron, 4 mg, Q8H PRN   Or  ondansetron, 4 mg, Q6H PRN  polyethylene glycol, 17 g, Daily PRN  hydrALAZINE, 10 mg, Q1H PRN  labetalol, 10 mg, Q1H PRN        Labs      Recent Results (from the past 24 hour(s))   POCT Glucose    Collection Time: 07/04/24  2:32 PM   Result Value Ref Range    POC Glucose 205 (H) 70 - 99 MG/DL        Imaging/Diagnostics Last 24 Hours   MRI BRAIN WO CONTRAST    Result Date: 7/4/2024  EXAMINATION: MRI BRAIN WO CONTRAST COMPARISON: CT 7/3/2024 HISTORY:  stroke TECHNIQUE: Sagittal T1, axial T1, T2, FLAIR, diffusion, coronal T1 sequences of the brain were obtained without contrast. FINDINGS: Cerebellar tonsils in a normal location. No abnormal signal in the clivus of the visualized cervical spine. The pituitary does not appear enlarged. No midline shift or mass effect. No hydrocephalus. Appropriate flow voids are maintained. There are remote bilateral basal ganglia lacunar infarcts The mastoid air cells, sinuses and orbits are unremarkable Minimal scattered foci of abnormal signal within the subcortical white matter probably represent areas of chronic periventricular ischemic change There is an acute right pontine infarct, no hemorrhage is identified.     1. Acute right pontine infarct. No hemorrhage identified

## 2024-07-05 NOTE — PROGRESS NOTES
Occupational Therapy    Occupational Therapy Treatment Note    Name: Shane Padilla MRN: [id number] :   1955   Date:  2024   Admission Date: 2024 Room:  [address] [place], [place] [address]A       Restrictions/Precautions:          fall risk, general precautions    Communication with other providers: RN, PT     Subjective:  Patient states:  \"Yesterday was a bad day. I feel better today but still weak\"  Pain:   Location, Type, Intensity (0/10 to 10/10):  denies    Objective:    Observation: standing in room upon arrival, agreeable   Objective Measures:  vitals stable on room air    Treatment, including education:    Therapeutic Activity Training:   Therapeutic activity training was instructed today.  Cues were given for safety, sequence, UE/LE placement, awareness, and balance.    Activities performed today included sit-stand, ambulation.    Ambulated 400' w/o AD w/ SBA, Vcs for pathway.   Stand to sit to recliner w/ SBA.    Declined ADL needs at this time. Left seated in recliner w/ all needs met.    Assessment / Impression:    Patient's tolerance of treatment: Well  Adverse Reaction: None  Significant change in status and impact: None  Barriers to improvement: None noted      Plan for Next Session:    Continue w/ OT POC and functional goals, address safety/independence w/ ADLs and transfers/mobility.     MD requesting re-evaluation, pt presents at same level as initial evaluation. Recommend [facility] therapy upon d/c.     Time in:  1137  Time out:  1147  Timed treatment minutes:  10  Total treatment time:  10      Electronically signed by:      Mikki Cooper OT

## 2024-07-05 NOTE — PROGRESS NOTES
07/05/24 1119   Encounter Summary   Encounter Overview/Reason Initial Encounter   Service Provided For Patient   Referral/Consult From [facility]   Support System Spouse   Last Encounter  07/05/24  (Patient was in good spirit during the visit. Patient requested prayer support.)   Complexity of Encounter Low   Begin Time 1010   End Time  1025   Total Time Calculated 15 min   Spiritual/Emotional needs   Type Spiritual Support   Grief, Loss, and Adjustments   Type Adjustment to illness   Assessment/Intervention/Outcome   Assessment Calm;Coping;Hopeful;Peaceful   Intervention Active listening;Empowerment;Discussed belief system/Jehovah's witness practices/bunny;Explored/Affirmed feelings, thoughts, concerns;Explored Coping Skills/Resources;Prayer (assurance of)/[place]   Outcome Coping;Encouraged;Engaged in conversation;Expressed feelings, needs, and concerns;Expressed Gratitude;Optimistic   Plan and Referrals   Plan/Referrals Continue Support (comment)  (as needed)

## 2024-07-05 NOTE — CARE COORDINATION
[facility] Liaison unable to see pt prior to dc. Notified [facility] unable to process referral until Monday as pcp office is closed. Aware of discharge & will initiate [facility] Monday once pcp order is received.

## 2024-07-08 NOTE — TELEPHONE ENCOUNTER
Spoke to patient. Appointment scheduled with Julianna PADILLA on Wednesday, 8/7/24 @ 945 am.  Appointment reminder letter sent to patient via mail along with map/directions to our office. Patient agreeable and verbalized understanding. Nothing further needed.

## 2024-07-31 NOTE — PROGRESS NOTES
Outpatient Vascular Neurology Service Consult Note     Patient Name: Shane Padilla  : 1955        Subjective:   Reason for consult:   Shane Padilla is a  68 -year-old male with a pmh of HTN presenting to establish care following stroke.  The patient presented to [facility] ED with left arm weakness and slurred speech on 24.    CT of the head was non-acute with chronic small vessel disease.  CTA of the head and neck was unremarkable  MRI of the brain demonstrated an acute right pontine infarct. No hemorrhage identified   TTE demonstrated and EF of 50-55% with no regional wall abnormalities or other stroke precipitants.  Bubble study was negative.    The patient has completed PT/OT.  He is still receiving speech therapy    A1C 5.4    Past Medical History:   Diagnosis Date    H/O echocardiogram 2015    EF 55-60% Normal LV and systolic function. Normal Echo.     History of cardiac monitoring 2015    Event - NSR    History of Holter monitoring 10/24/2015    24 hour - no significant ectopy, predominant rhythm sinus.    HTN (hypertension)     Hx of cardiovascular stress test 2015    treadmill     Past Surgical History:   Procedure Laterality Date    COLONOSCOPY      COLONOSCOPY  2019    Polyps x2, Mild diverticulosis, Internal grade 1 hemorrhoids    COLONOSCOPY N/A 2019    COLONOSCOPY POLYPECTOMY SNARE/COLD BIOPSY performed by Domo Harvey MD at [facility] OR    TONSILLECTOMY      as a child     Allergies   Allergen Reactions    Pcn [Penicillins]      Social History     Socioeconomic History    Marital status:      Spouse name: Not on file    Number of children: Not on file    Years of education: Not on file    Highest education level: Not on file   Occupational History    Not on file   Tobacco Use    Smoking status: Never    Smokeless tobacco: Never   Substance and Sexual Activity    Alcohol use: No     Comment: Caffeine: 1-2 cups caffeinated coffee daily.

## 2024-08-07 ENCOUNTER — OFFICE VISIT (OUTPATIENT)
Age: 69
End: 2024-08-07
Payer: MEDICARE

## 2024-08-07 VITALS
BODY MASS INDEX: 32.97 KG/M2 | DIASTOLIC BLOOD PRESSURE: 86 MMHG | SYSTOLIC BLOOD PRESSURE: 134 MMHG | RESPIRATION RATE: 16 BRPM | OXYGEN SATURATION: 95 % | WEIGHT: 223.25 LBS | HEART RATE: 74 BPM

## 2024-08-07 DIAGNOSIS — I69.30 SEQUELA OF CEREBROVASCULAR ACCIDENT: Primary | ICD-10-CM

## 2024-08-07 PROCEDURE — 1123F ACP DISCUSS/DSCN MKR DOCD: CPT | Performed by: NURSE PRACTITIONER

## 2024-08-07 PROCEDURE — 3079F DIAST BP 80-89 MM HG: CPT | Performed by: NURSE PRACTITIONER

## 2024-08-07 PROCEDURE — G8417 CALC BMI ABV UP PARAM F/U: HCPCS | Performed by: NURSE PRACTITIONER

## 2024-08-07 PROCEDURE — 3075F SYST BP GE 130 - 139MM HG: CPT | Performed by: NURSE PRACTITIONER

## 2024-08-07 PROCEDURE — 3017F COLORECTAL CA SCREEN DOC REV: CPT | Performed by: NURSE PRACTITIONER

## 2024-08-07 PROCEDURE — 4004F PT TOBACCO SCREEN RCVD TLK: CPT | Performed by: NURSE PRACTITIONER

## 2024-08-07 PROCEDURE — 99204 OFFICE O/P NEW MOD 45 MIN: CPT | Performed by: NURSE PRACTITIONER

## 2024-08-07 PROCEDURE — G8427 DOCREV CUR MEDS BY ELIG CLIN: HCPCS | Performed by: NURSE PRACTITIONER

## 2024-08-07 RX ORDER — ALBUTEROL SULFATE 90 UG/1
AEROSOL, METERED RESPIRATORY (INHALATION)
COMMUNITY

## 2024-08-07 NOTE — PROGRESS NOTES
Brian MCOT placed on patient - SN # [id number]   Information reviewed with patient  Proper placement of sensor (ECG recorder) on chest  Keep the monitor (phone) and sensor (ECG recorder) in close proximity at all times  Charge monitor (phone) and sensor (ECG recorder) when battery is low with the  provided  Shower or exercise as normal while wearing MCOT patch. Do not swim or take a bath. Patch is water-resistant, not waterproof.  Monitor (phone) cannot make calls or connect to the internet  Use monitor (phone) to report symptoms if they occur  Return all equipment in pre-paid envelope to UPS drop box or return to office once monitoring period is complete  Contact Patient Services if you experience any difficulty when removing the sensor (ECG recorder) from the patch or have any questions throughout your monitoring period   Patient verbalized understanding

## 2024-08-11 ENCOUNTER — PATIENT MESSAGE (OUTPATIENT)
Age: 69
End: 2024-08-11

## 2024-09-19 ENCOUNTER — TELEPHONE (OUTPATIENT)
Dept: CARDIOLOGY CLINIC | Age: 69
End: 2024-09-19

## 2024-09-19 ENCOUNTER — OFFICE VISIT (OUTPATIENT)
Age: 69
End: 2024-09-19
Payer: MEDICARE

## 2024-09-19 VITALS
OXYGEN SATURATION: 97 % | BODY MASS INDEX: 32.99 KG/M2 | RESPIRATION RATE: 15 BRPM | HEART RATE: 70 BPM | DIASTOLIC BLOOD PRESSURE: 88 MMHG | SYSTOLIC BLOOD PRESSURE: 138 MMHG | WEIGHT: 223.38 LBS

## 2024-09-19 DIAGNOSIS — I69.30 SEQUELA OF CEREBROVASCULAR ACCIDENT: Primary | ICD-10-CM

## 2024-09-19 DIAGNOSIS — I47.20 V-TACH (HCC): ICD-10-CM

## 2024-09-19 DIAGNOSIS — G47.33 OSA (OBSTRUCTIVE SLEEP APNEA): ICD-10-CM

## 2024-09-19 PROCEDURE — 1123F ACP DISCUSS/DSCN MKR DOCD: CPT | Performed by: NURSE PRACTITIONER

## 2024-09-19 PROCEDURE — 99213 OFFICE O/P EST LOW 20 MIN: CPT | Performed by: NURSE PRACTITIONER

## 2024-09-19 PROCEDURE — G8417 CALC BMI ABV UP PARAM F/U: HCPCS | Performed by: NURSE PRACTITIONER

## 2024-09-19 PROCEDURE — 4004F PT TOBACCO SCREEN RCVD TLK: CPT | Performed by: NURSE PRACTITIONER

## 2024-09-19 PROCEDURE — 3075F SYST BP GE 130 - 139MM HG: CPT | Performed by: NURSE PRACTITIONER

## 2024-09-19 PROCEDURE — G8427 DOCREV CUR MEDS BY ELIG CLIN: HCPCS | Performed by: NURSE PRACTITIONER

## 2024-09-19 PROCEDURE — 3017F COLORECTAL CA SCREEN DOC REV: CPT | Performed by: NURSE PRACTITIONER

## 2024-09-19 PROCEDURE — 3079F DIAST BP 80-89 MM HG: CPT | Performed by: NURSE PRACTITIONER

## 2024-10-02 ENCOUNTER — INITIAL CONSULT (OUTPATIENT)
Dept: CARDIOLOGY CLINIC | Age: 69
End: 2024-10-02
Payer: MEDICARE

## 2024-10-02 VITALS
OXYGEN SATURATION: 96 % | HEART RATE: 81 BPM | BODY MASS INDEX: 33.33 KG/M2 | HEIGHT: 69 IN | DIASTOLIC BLOOD PRESSURE: 88 MMHG | SYSTOLIC BLOOD PRESSURE: 124 MMHG | WEIGHT: 225 LBS

## 2024-10-02 DIAGNOSIS — R06.02 SHORTNESS OF BREATH: ICD-10-CM

## 2024-10-02 DIAGNOSIS — I10 ESSENTIAL HYPERTENSION: ICD-10-CM

## 2024-10-02 DIAGNOSIS — E78.5 DYSLIPIDEMIA: ICD-10-CM

## 2024-10-02 DIAGNOSIS — R94.31 ABNORMAL ELECTROCARDIOGRAPHY: ICD-10-CM

## 2024-10-02 DIAGNOSIS — I49.3 PVC (PREMATURE VENTRICULAR CONTRACTION): ICD-10-CM

## 2024-10-02 DIAGNOSIS — I63.9 ACUTE CVA (CEREBROVASCULAR ACCIDENT) (HCC): Primary | ICD-10-CM

## 2024-10-02 PROCEDURE — G8484 FLU IMMUNIZE NO ADMIN: HCPCS | Performed by: INTERNAL MEDICINE

## 2024-10-02 PROCEDURE — 3074F SYST BP LT 130 MM HG: CPT | Performed by: INTERNAL MEDICINE

## 2024-10-02 PROCEDURE — 3079F DIAST BP 80-89 MM HG: CPT | Performed by: INTERNAL MEDICINE

## 2024-10-02 PROCEDURE — 1036F TOBACCO NON-USER: CPT | Performed by: INTERNAL MEDICINE

## 2024-10-02 PROCEDURE — G8417 CALC BMI ABV UP PARAM F/U: HCPCS | Performed by: INTERNAL MEDICINE

## 2024-10-02 PROCEDURE — G8427 DOCREV CUR MEDS BY ELIG CLIN: HCPCS | Performed by: INTERNAL MEDICINE

## 2024-10-02 PROCEDURE — 1123F ACP DISCUSS/DSCN MKR DOCD: CPT | Performed by: INTERNAL MEDICINE

## 2024-10-02 PROCEDURE — 99204 OFFICE O/P NEW MOD 45 MIN: CPT | Performed by: INTERNAL MEDICINE

## 2024-10-02 PROCEDURE — 3017F COLORECTAL CA SCREEN DOC REV: CPT | Performed by: INTERNAL MEDICINE

## 2024-10-02 RX ORDER — ATENOLOL 25 MG/1
25 TABLET ORAL DAILY
Qty: 30 TABLET | Refills: 3 | Status: SHIPPED | OUTPATIENT
Start: 2024-10-02

## 2024-10-02 NOTE — PROGRESS NOTES
Geovanni Hutton MD                                  CARDIOLOGY  NOTE        Referring Physician: Tiago Faustin MD    Thank you for consultation.     Chief Complaint:    Chief Complaint   Patient presents with    Consultation     Pt reports having some palpitations that come and go sometimes, otherwise pt denies any new or worsened cardiac sx.        HPI:     Shane is a 68 y.o. year old male who presents to clinic to be evaluated for abnormal Holter monitor.  Patient unfortunately had a stroke in July, status post TNK.  Patient subsequently underwent rehabilitation and currently getting physical therapy.  Post stroke, patient had a 30-day event monitor which indicated 2 episodes of nonsustained ventricular tachycardia as well as PVC burden of 2%.  Patient denies any chest pain   Has mild dyspnea - Occasional palpitations.\"  Patient complains of mild fatigue and tiredness however symptoms are improving.  Is a  that he works with and is increasing exercise tolerance.    EKG was sinus rhythm        ECHO 7/3/2024       Left Ventricle: Normal left ventricular systolic function with a visually estimated EF of 50 - 55%. Left ventricle size is normal. Normal wall thickness. Normal wall motion.    No significant valvular abnormalities.    Interatrial Septum: Agitated saline study was negative with and without provocation.    Pericardium: No pericardial effusion.    Normal Sinus Rhythm  PVC burden 2%  2 episodes of nonsustained ventricular tachycardia  Nonsustained ventricular tachycardia 4 beats at 178 bpm on 8/25/2024 at 8:24 EDT and 8 beats 8/14/2024 at 9:43 EDT  No Atrial Fibrillation noted     RECOMMENDATIONS:     Recommend outpatient follow-up with cardiology  Medical Management          Current Outpatient Medications   Medication Sig Dispense Refill    atenolol (TENORMIN) 25 MG tablet Take 1 tablet by mouth daily 30 tablet 3    albuterol sulfate HFA (PROVENTIL;VENTOLIN;PROAIR) 108 (90 Base) MCG/ACT

## 2024-10-22 ENCOUNTER — TELEPHONE (OUTPATIENT)
Dept: CARDIOLOGY CLINIC | Age: 69
End: 2024-10-22

## 2024-10-22 NOTE — TELEPHONE ENCOUNTER
10/16/24 NM      Stress Combined Conclusion: Normal pharmacological myocardial perfusion study.    Perfusion Comments: LV perfusion is normal.    Perfusion Conclusion: There is no evidence of transient ischemic dilation (TID).    Image quality is good.    Stress Test: A Matias protocol stress test was performed. Overall, the patient's exercise capacity was above average for their age. The patient reached stage 3 of the protocol and was stressed for 7 min and 30 sec. Hemodynamics are adequate for diagnosis. Blood pressure demonstrated a normal response and heart rate demonstrated a normal response to stress. The patient's heart rate recovery was normal.        Normal Lexiscan stress MPI  LV ejection fraction 68%  Medical management  Outpatient follow-up routine    LM ON VM OF RESULTS BEING \"WNL\"

## 2024-11-06 ENCOUNTER — OFFICE VISIT (OUTPATIENT)
Dept: CARDIOLOGY CLINIC | Age: 69
End: 2024-11-06

## 2024-11-06 VITALS
WEIGHT: 224 LBS | DIASTOLIC BLOOD PRESSURE: 82 MMHG | HEIGHT: 69 IN | OXYGEN SATURATION: 97 % | SYSTOLIC BLOOD PRESSURE: 132 MMHG | BODY MASS INDEX: 33.18 KG/M2 | HEART RATE: 60 BPM

## 2024-11-06 DIAGNOSIS — R06.02 SHORTNESS OF BREATH: ICD-10-CM

## 2024-11-06 DIAGNOSIS — I47.29 NSVT (NONSUSTAINED VENTRICULAR TACHYCARDIA) (HCC): ICD-10-CM

## 2024-11-06 DIAGNOSIS — I49.3 VENTRICULAR ECTOPIC BEATS: ICD-10-CM

## 2024-11-06 DIAGNOSIS — I49.3 PVC (PREMATURE VENTRICULAR CONTRACTION): Primary | ICD-10-CM

## 2024-11-06 DIAGNOSIS — E78.5 DYSLIPIDEMIA: ICD-10-CM

## 2024-11-06 DIAGNOSIS — I10 ESSENTIAL HYPERTENSION: ICD-10-CM

## 2024-11-06 DIAGNOSIS — Z86.73 HX OF COMPLETED STROKE: ICD-10-CM

## 2024-11-06 NOTE — PROGRESS NOTES
Geovanni Hutton MD                                  CARDIOLOGY  NOTE         Chief Complaint:    Chief Complaint   Patient presents with    Follow-up     Pt states no cardiac sx       Lexiscan stress MPI 10/16/2024:    Normal Lexiscan stress MPI  LV ejection fraction 68%  Medical management  Outpatient follow-up routine      Prior HPI:     Shane is a 69 y.o. year old male who presents to clinic to be evaluated for abnormal Holter monitor.  Patient unfortunately had a stroke in July, status post TNK.  Patient subsequently underwent rehabilitation and currently getting physical therapy.  Post stroke, patient had a 30-day event monitor which indicated 2 episodes of nonsustained ventricular tachycardia as well as PVC burden of 2%.  Patient denies any chest pain   Has mild dyspnea - Occasional palpitations.\"  Patient complains of mild fatigue and tiredness however symptoms are improving.  Is a  that he works with and is increasing exercise tolerance.    EKG was sinus rhythm        ECHO 7/3/2024       Left Ventricle: Normal left ventricular systolic function with a visually estimated EF of 50 - 55%. Left ventricle size is normal. Normal wall thickness. Normal wall motion.    No significant valvular abnormalities.    Interatrial Septum: Agitated saline study was negative with and without provocation.    Pericardium: No pericardial effusion.    Normal Sinus Rhythm  PVC burden 2%  2 episodes of nonsustained ventricular tachycardia  Nonsustained ventricular tachycardia 4 beats at 178 bpm on 8/25/2024 at 8:24 EDT and 8 beats 8/14/2024 at 9:43 EDT  No Atrial Fibrillation noted     RECOMMENDATIONS:     Recommend outpatient follow-up with cardiology  Medical Management          Current Outpatient Medications   Medication Sig Dispense Refill    atenolol (TENORMIN) 25 MG tablet Take 1 tablet by mouth daily 30 tablet 3    albuterol sulfate HFA (PROVENTIL;VENTOLIN;PROAIR) 108 (90 Base) MCG/ACT inhaler INL 2 PFS PO

## 2025-05-14 NOTE — PROGRESS NOTES
MRN: [id number]  Name: Shane Padilla  : 1955    Insurance: Payor: MEDICARE /  /  /      Phone #: [phone]  Provider: Geovanni Hutton MD     Date of Visit: 2025    Reason for visit: 6 MO Recent Hospitalization Date:    Reason for Hospitalization:    Last EK  Type of Device:        Vitals BP HR O2% WT HT ORTHO BP LYING ORTHO BP SITTING ORTHO BP SITTING   Today's Findings           Patients work up- Check List     Testing Last Date Completed Date Expected  ([place]) Additional Notes    MA to document For provider to complete Either MA or Provider    Carotid Duplex  STAT 1 WK 6 MTH       THIS WK 2 WK 1 YEAR     Cardiac CTA  STAT 1 WK 6 MTH       THIS WK 2 WK 1 YEAR     Cardiac CT Calcium scoring  STAT 1 WK 6 MTH       THIS WK 2 WK 1 YEAR     CTA Chest, Abdomen & Pelvis  STAT 1 WK 6 MTH       THIS WK 2 WK 1 YEAR     CT Chest IV w/ Contrast  STAT 1 WK 6 MTH       THIS WK 2 WK 1 YEAR     CT Chest w/o Contrast  STAT 1 WK 6 MTH       THIS WK 2 WK 1 YEAR     CXR  STAT 1 WK 6 MTH       THIS WK 2 WK 1 YEAR     ECHO  Stress Complete Limited     MRI- Cardiac  STAT 1 WK 6 MTH       THIS WK 2 WK 1 YEAR     MUGA Scan  STAT 1 WK 6 MTH       THIS WK 2 WK 1 YEAR     Nuclear Stress 10/24 Lexiscan Cardiolite     PFT  STAT 1 WK 6 MTH       THIS WK 2 WK 1 YEAR     Treadmill Stress Test  STAT 1 WK 6 MTH       THIS WK 2 WK 1 YEAR     Vascular Duplex  Lower: Right Left Bilat       Upper: Right Left Bilat     Other Test Not Listed:    Monitors Last Date Completed Day's Request/Ordered     Holter  Short term 24 hours 48 hours      Long term 3 days 7 days 14 days   Event   (1-30 days)      Procedures Last Date Performed Procedure Details Date Expected   Additional Notes    ASD Closure        Carotid Angio        Cardioversion        Heart Cath  R L R&L      Peripheral Angio  R L      PFO Closure        PTCA/PCI        OSMIN        OSMIN/Cardioversion        Venogram        Tilt Table        Other Type of

## 2025-05-20 ENCOUNTER — OFFICE VISIT (OUTPATIENT)
Dept: CARDIOLOGY CLINIC | Age: 70
End: 2025-05-20
Payer: MEDICARE

## 2025-05-20 VITALS
DIASTOLIC BLOOD PRESSURE: 78 MMHG | BODY MASS INDEX: 32.58 KG/M2 | WEIGHT: 220 LBS | HEIGHT: 69 IN | HEART RATE: 60 BPM | SYSTOLIC BLOOD PRESSURE: 130 MMHG

## 2025-05-20 DIAGNOSIS — I10 ESSENTIAL HYPERTENSION: ICD-10-CM

## 2025-05-20 DIAGNOSIS — R00.2 PALPITATIONS: ICD-10-CM

## 2025-05-20 DIAGNOSIS — R06.02 SHORTNESS OF BREATH: ICD-10-CM

## 2025-05-20 DIAGNOSIS — I49.3 PVC (PREMATURE VENTRICULAR CONTRACTION): Primary | ICD-10-CM

## 2025-05-20 DIAGNOSIS — E78.5 DYSLIPIDEMIA: ICD-10-CM

## 2025-05-20 PROCEDURE — 1123F ACP DISCUSS/DSCN MKR DOCD: CPT | Performed by: INTERNAL MEDICINE

## 2025-05-20 PROCEDURE — 3078F DIAST BP <80 MM HG: CPT | Performed by: INTERNAL MEDICINE

## 2025-05-20 PROCEDURE — 99214 OFFICE O/P EST MOD 30 MIN: CPT | Performed by: INTERNAL MEDICINE

## 2025-05-20 PROCEDURE — 3075F SYST BP GE 130 - 139MM HG: CPT | Performed by: INTERNAL MEDICINE

## 2025-05-20 PROCEDURE — 1159F MED LIST DOCD IN RCRD: CPT | Performed by: INTERNAL MEDICINE

## 2025-05-20 RX ORDER — ROSUVASTATIN CALCIUM 20 MG/1
20 TABLET, COATED ORAL DAILY
COMMUNITY

## 2025-05-20 NOTE — PROGRESS NOTES
CLINICAL STAFF DOCUMENTATION    Dr. Geovanni Padilla  1955  [id number]    Have you had any Chest Pain recently? - No  Have you had any Shortness of Breath - No    Have you had any dizziness - No    Have you had any palpitations recently? - No    Any thyroid issues? - No    Do you have any edema - swelling in No      Is the patient on any of the following medications -   If Yes DO EKG - Needs done every 3 months    When did you have your last labs drawn 2025  What doctor ordered   Do we have the labs in their chart Yes  If we do not have the labs, ask where they were drawn     If we do not have these labs, you are retrieve these labs for the provider!    Do you need any prescriptions refilled? - No    Do you have a surgery or procedure scheduled in the near future - No    Do use tobacco products? - No  Do you drink alcohol? - No  Do you use any illicit drugs? - No  Caffeine? - Yes  How much caffeine? .3 a week  cups       Check medication list thoroughly!!! AND RECONCILE OUTSIDE MEDICATIONS  If dose has changed change the entire order not just the MG  BE SURE TO ASK PATIENT IF THEY NEED MEDICATION REFILLS  Verify Pharmacy and update if incorrect    Add to every patient's \"wrap up\" the following dot phrase AFTERVISITCARDIOHEARTHOUSE and ensure we explain this to our patients    
tea occ             Objective:      Physical Exam:    /78 (BP Site: Left Upper Arm, Patient Position: Sitting, BP Cuff Size: Large Adult)   Pulse 60   Ht 1.753 m (5' 9\")   Wt 99.8 kg (220 lb)   BMI 32.49 kg/m²   Wt Readings from Last 3 Encounters:   05/20/25 99.8 kg (220 lb)   11/06/24 101.6 kg (224 lb)   10/02/24 102.1 kg (225 lb)     Body mass index is 32.49 kg/m².  Vitals:    05/20/25 1520   BP: 130/78   Pulse: 60        General Appearance and Constitutional: Conversant, Well developed, Well nourished, No acute distress, Non-toxic appearance.   HEENT:  Normocephalic, Atraumatic, Bilateral external ears normal, Oropharynx moist, No oral exudates,   Nose normal.   Neck- Normal range of motion, No tenderness, Supple  Eyes:  EOMI, Conjunctiva normal, No discharge.   Respiratory:  Normal breath sounds, No respiratory distress, No wheezing, No Rales, No Ronchi.  No chest tenderness.   Cardiovascular: S1-S2, no added heart sounds, No Mumurs appreciated. No gallops, rubs. No Pedal Edema   GI:  Bowel sounds normal, Soft, No tenderness,  :  No costovertebral angle tenderness   Musculoskeletal:  No gross deformities. Back- No tenderness  Integument:  Well hydrated, no rash   Lymphatic:  No lymphadenopathy noted   Neurologic:  Alert & oriented x 3, Normal motor function, normal sensory function, no focal deficits noted   Psychiatric:  Speech and behavior appropriate       Medical decision making and Data review:    DATA:    Lab Results   Component Value Date    TROPONINT <0.010 10/10/2014     BNP:  No results found for: \"PROBNP\"  PT/INR:  No results found for: \"PTINR\"  Lab Results   Component Value Date    LABA1C 5.4 07/03/2024    LABA1C 5.7 07/28/2017     Lab Results   Component Value Date    CHOL 217 (H) 07/03/2024    TRIG 106 07/03/2024    HDL 42 07/03/2024    LDLDIRECT 143 (H) 07/28/2017     Lab Results   Component Value Date    WBC 7.7 07/04/2024    HGB 13.4 (L) 07/04/2024    HCT 39.6 (L) 07/04/2024    MCV

## 2025-05-20 NOTE — PATIENT INSTRUCTIONS
Thank you for allowing us to care for you today!   We want to ensure we can follow your treatment plan and we strive to give you the best outcomes and experience possible.   If you ever have a life threatening emergency and call 911 - for an ambulance (EMS)  REMEMBER  Our providers can only care for you at:   [facility] or [facility]   Even if you have someone take you or you drive yourself we can only care for you in a [facility] facility. Our providers are not setup at the other healthcare locations!    PLEASE CALL OUR OFFICE DURING NORMAL BUSINESS HOURS  Monday through Friday 8 am to 5 pm  AFTER HOURS the physician on-call cannot help with scheduling, rescheduling, procedure instruction questions or any type of medication need or issue.  [facility] P:[phone] - [facility] P:[phone] - [facility] P:[phone]      If you receive a survey:  We would appreciate you taking the time to share your experience concerning your provider visit in the office.    These surveys are confidential!  We are eager to improve and are counting on you to share your feedback so we can ensure you get the best care possible.

## (undated) DEVICE — KENDALL 500 SERIES DIAPHORETIC FOAM MONITORING ELECTRODE - TEAR DROP SHAPE ( 30/PK): Brand: KENDALL

## (undated) DEVICE — JELLY LUBRICATING 3 GM BACTERIOSTATIC

## (undated) DEVICE — TUBING, SUCTION, 3/16" X 6', STRAIGHT: Brand: MEDLINE

## (undated) DEVICE — BW-412T DISP COMBO CLEANING BRUSH: Brand: SINGLE USE COMBINATION CLEANING BRUSH

## (undated) DEVICE — LINE SAMP O2 6.5FT W/FEMALE CONN F/ADULT CAPNOLINE PLUS

## (undated) DEVICE — ACUSNARE POLYPECTOMY SNARE: Brand: ACUSNARE

## (undated) DEVICE — LINER SUCT CANSTR 1500CC SEMI RIG W/ POR HYDROPHOBIC SHUT

## (undated) DEVICE — THE TORRENT IRRIGATION SCOPE CONNECTOR IS USED WITH THE TORRENT IRRIGATION TUBING TO PROVIDE IRRIGATION FLUIDS SUCH AS STERILE WATER DURING GASTROINTESTINAL ENDOSCOPIC PROCEDURES WHEN USED IN CONJUNCTION WITH AN IRRIGATION PUMP (OR ELECTROSURGICAL UNIT).: Brand: TORRENT